# Patient Record
Sex: FEMALE | Race: BLACK OR AFRICAN AMERICAN | NOT HISPANIC OR LATINO | Employment: FULL TIME | URBAN - METROPOLITAN AREA
[De-identification: names, ages, dates, MRNs, and addresses within clinical notes are randomized per-mention and may not be internally consistent; named-entity substitution may affect disease eponyms.]

---

## 2017-11-05 ENCOUNTER — APPOINTMENT (OUTPATIENT)
Dept: RADIOLOGY | Facility: HOSPITAL | Age: 22
End: 2017-11-05
Payer: COMMERCIAL

## 2017-11-05 ENCOUNTER — OFFICE VISIT (OUTPATIENT)
Dept: URGENT CARE | Facility: CLINIC | Age: 22
End: 2017-11-05
Payer: COMMERCIAL

## 2017-11-05 VITALS
HEIGHT: 63 IN | TEMPERATURE: 97 F | HEART RATE: 96 BPM | SYSTOLIC BLOOD PRESSURE: 124 MMHG | DIASTOLIC BLOOD PRESSURE: 74 MMHG | WEIGHT: 134.94 LBS | BODY MASS INDEX: 23.91 KG/M2 | OXYGEN SATURATION: 98 %

## 2017-11-05 DIAGNOSIS — H92.02 LEFT EAR PAIN: ICD-10-CM
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
## 2017-11-05 DIAGNOSIS — M54.2 CERVICAL PAIN: ICD-10-CM

## 2017-11-05 DIAGNOSIS — M62.838 TRAPEZIUS MUSCLE SPASM: ICD-10-CM

## 2017-11-05 DIAGNOSIS — M79.632 PAIN OF LEFT FOREARM: ICD-10-CM

## 2017-11-05 DIAGNOSIS — H93.12 TINNITUS OF LEFT EAR: ICD-10-CM
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
## 2017-11-05 PROCEDURE — 99204 OFFICE O/P NEW MOD 45 MIN: CPT | Mod: S$GLB,,, | Performed by: FAMILY MEDICINE

## 2017-11-05 PROCEDURE — 73090 X-RAY EXAM OF FOREARM: CPT | Mod: TC,PO,LT

## 2017-11-05 PROCEDURE — 73090 X-RAY EXAM OF FOREARM: CPT | Mod: 26,LT,, | Performed by: RADIOLOGY

## 2017-11-05 PROCEDURE — 99999 PR PBB SHADOW E&M-NEW PATIENT-LVL III: CPT | Mod: PBBFAC,,,

## 2017-11-05 RX ORDER — NAPROXEN 500 MG/1
500 TABLET ORAL 2 TIMES DAILY
Qty: 20 TABLET | Refills: 0 | Status: SHIPPED | OUTPATIENT
Start: 2017-11-05 | End: 2017-11-15

## 2017-11-05 RX ORDER — CYCLOBENZAPRINE HCL 10 MG
10 TABLET ORAL NIGHTLY PRN
Qty: 20 TABLET | Refills: 0 | Status: SHIPPED | OUTPATIENT
Start: 2017-11-05 | End: 2017-11-15
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
## 2022-07-08 ENCOUNTER — OFFICE VISIT (OUTPATIENT)
Dept: INTERNAL MEDICINE | Facility: CLINIC | Age: 27
End: 2022-07-08
Payer: MEDICAID

## 2022-07-08 VITALS
DIASTOLIC BLOOD PRESSURE: 78 MMHG | HEIGHT: 63 IN | BODY MASS INDEX: 29.21 KG/M2 | SYSTOLIC BLOOD PRESSURE: 118 MMHG | TEMPERATURE: 97 F | HEART RATE: 80 BPM | WEIGHT: 164.88 LBS

## 2022-07-08 DIAGNOSIS — Z82.49 FAMILY HISTORY OF EARLY CAD: ICD-10-CM

## 2022-07-08 DIAGNOSIS — K21.9 GASTROESOPHAGEAL REFLUX DISEASE, UNSPECIFIED WHETHER ESOPHAGITIS PRESENT: Primary | ICD-10-CM

## 2022-07-08 DIAGNOSIS — Z12.4 SCREENING FOR MALIGNANT NEOPLASM OF CERVIX: ICD-10-CM

## 2022-07-08 DIAGNOSIS — H53.9 VISION DISTURBANCE: ICD-10-CM

## 2022-07-08 DIAGNOSIS — Z91.09 ENVIRONMENTAL ALLERGIES: ICD-10-CM

## 2022-07-08 DIAGNOSIS — Z28.9 DELAYED IMMUNIZATIONS: ICD-10-CM

## 2022-07-08 PROCEDURE — 99999 PR PBB SHADOW E&M-NEW PATIENT-LVL IV: ICD-10-PCS | Mod: PBBFAC,,, | Performed by: FAMILY MEDICINE

## 2022-07-08 PROCEDURE — 99204 OFFICE O/P NEW MOD 45 MIN: CPT | Mod: S$PBB,,, | Performed by: FAMILY MEDICINE

## 2022-07-08 PROCEDURE — 1159F PR MEDICATION LIST DOCUMENTED IN MEDICAL RECORD: ICD-10-PCS | Mod: CPTII,,, | Performed by: FAMILY MEDICINE

## 2022-07-08 PROCEDURE — 99204 OFFICE O/P NEW MOD 45 MIN: CPT | Mod: PBBFAC,PO | Performed by: FAMILY MEDICINE

## 2022-07-08 PROCEDURE — 3074F SYST BP LT 130 MM HG: CPT | Mod: CPTII,,, | Performed by: FAMILY MEDICINE

## 2022-07-08 PROCEDURE — 3074F PR MOST RECENT SYSTOLIC BLOOD PRESSURE < 130 MM HG: ICD-10-PCS | Mod: CPTII,,, | Performed by: FAMILY MEDICINE

## 2022-07-08 PROCEDURE — 3078F PR MOST RECENT DIASTOLIC BLOOD PRESSURE < 80 MM HG: ICD-10-PCS | Mod: CPTII,,, | Performed by: FAMILY MEDICINE

## 2022-07-08 PROCEDURE — 99999 PR PBB SHADOW E&M-NEW PATIENT-LVL IV: CPT | Mod: PBBFAC,,, | Performed by: FAMILY MEDICINE

## 2022-07-08 PROCEDURE — 1160F PR REVIEW ALL MEDS BY PRESCRIBER/CLIN PHARMACIST DOCUMENTED: ICD-10-PCS | Mod: CPTII,,, | Performed by: FAMILY MEDICINE

## 2022-07-08 PROCEDURE — 3078F DIAST BP <80 MM HG: CPT | Mod: CPTII,,, | Performed by: FAMILY MEDICINE

## 2022-07-08 PROCEDURE — 3008F BODY MASS INDEX DOCD: CPT | Mod: CPTII,,, | Performed by: FAMILY MEDICINE

## 2022-07-08 PROCEDURE — 99204 PR OFFICE/OUTPT VISIT, NEW, LEVL IV, 45-59 MIN: ICD-10-PCS | Mod: S$PBB,,, | Performed by: FAMILY MEDICINE

## 2022-07-08 PROCEDURE — 1159F MED LIST DOCD IN RCRD: CPT | Mod: CPTII,,, | Performed by: FAMILY MEDICINE

## 2022-07-08 PROCEDURE — 1160F RVW MEDS BY RX/DR IN RCRD: CPT | Mod: CPTII,,, | Performed by: FAMILY MEDICINE

## 2022-07-08 PROCEDURE — 3008F PR BODY MASS INDEX (BMI) DOCUMENTED: ICD-10-PCS | Mod: CPTII,,, | Performed by: FAMILY MEDICINE

## 2022-07-08 RX ORDER — PANTOPRAZOLE SODIUM 40 MG/1
40 TABLET, DELAYED RELEASE ORAL DAILY
Qty: 30 TABLET | Refills: 0 | Status: SHIPPED | OUTPATIENT
Start: 2022-07-08 | End: 2022-08-07

## 2022-07-08 NOTE — PROGRESS NOTES
Subjective:       Patient ID: Laverne Arcos is a 27 y.o. female.    Chief Complaint: Establish Care and Heartburn    Gastroesophageal Reflux  She complains of chest pain. She reports no abdominal pain or no belching. This is a recurrent problem. The current episode started 1 to 4 weeks ago. The problem occurs constantly. The problem has been gradually worsening.     Review of Systems   Respiratory: Negative for shortness of breath.    Cardiovascular: Positive for chest pain.   Gastrointestinal: Negative for abdominal pain.       Objective:      Physical Exam  Vitals and nursing note reviewed.   Constitutional:       General: She is not in acute distress.     Appearance: Normal appearance. She is well-developed. She is not diaphoretic.   HENT:      Head: Normocephalic and atraumatic.   Pulmonary:      Effort: Pulmonary effort is normal. No respiratory distress.      Breath sounds: Normal breath sounds. No wheezing.   Abdominal:      General: There is no distension.      Palpations: Abdomen is soft.      Tenderness: There is no abdominal tenderness. There is no guarding.   Skin:     General: Skin is warm and dry.      Findings: No erythema or rash.   Neurological:      Mental Status: She is alert.         Assessment:       1. Gastroesophageal reflux disease, unspecified whether esophagitis present    2. Screening for malignant neoplasm of cervix    3. Delayed immunizations    4. Vision disturbance    5. Family history of early CAD    6. Environmental allergies        Plan:     Problem List Items Addressed This Visit        Ophtho    Vision disturbance    Relevant Orders    Ambulatory referral/consult to Optometry       Cardiac/Vascular    Family history of early CAD    Relevant Orders    Ambulatory referral/consult to Cardiology       Renal/    Screening for malignant neoplasm of cervix       ID    Delayed immunizations       GI    Gastroesophageal reflux disease - Primary    Relevant Medications     pantoprazole (PROTONIX) 40 MG tablet    Other Relevant Orders    H. pylori antigen, stool       Other    Environmental allergies    Relevant Orders    Ambulatory referral/consult to Allergy

## 2022-07-13 DIAGNOSIS — Z82.49 FAMILY HISTORY OF EARLY CAD: Primary | ICD-10-CM

## 2022-07-14 ENCOUNTER — TELEPHONE (OUTPATIENT)
Dept: ALLERGY | Facility: CLINIC | Age: 27
End: 2022-07-14
Payer: MEDICAID

## 2022-07-14 NOTE — TELEPHONE ENCOUNTER
Nicolle , patient scheduled with [facility] 7/20/22 for allergy testing.///bentley          ----- Message from Maria M Navas sent at 7/14/2022  9:51 AM CDT -----  Regarding: Missed call  Contact: Patient  .Type:  Patient Returning Call    Who Called: Patient  Who Left Message for Patient: Nicolle   Does the patient know what this is regarding?: missed call / scheduling  Would the patient rather a call back or a response via [facility]? Call  Best Call Back Number: .[phone] (home)     Additional Information:

## 2022-07-15 ENCOUNTER — HOSPITAL ENCOUNTER (OUTPATIENT)
Dept: CARDIOLOGY | Facility: HOSPITAL | Age: 27
Discharge: HOME | End: 2022-07-15
Attending: INTERNAL MEDICINE
Payer: MEDICAID

## 2022-07-15 ENCOUNTER — OFFICE VISIT (OUTPATIENT)
Dept: CARDIOLOGY | Facility: CLINIC | Age: 27
End: 2022-07-15
Payer: MEDICAID

## 2022-07-15 VITALS
DIASTOLIC BLOOD PRESSURE: 82 MMHG | HEART RATE: 109 BPM | WEIGHT: 163 LBS | HEIGHT: 63 IN | OXYGEN SATURATION: 98 % | BODY MASS INDEX: 28.88 KG/M2 | SYSTOLIC BLOOD PRESSURE: 132 MMHG

## 2022-07-15 DIAGNOSIS — R07.89 OTHER CHEST PAIN: Primary | ICD-10-CM

## 2022-07-15 DIAGNOSIS — Z82.49 FAMILY HISTORY OF EARLY CAD: ICD-10-CM

## 2022-07-15 DIAGNOSIS — K21.9 GASTROESOPHAGEAL REFLUX DISEASE, UNSPECIFIED WHETHER ESOPHAGITIS PRESENT: ICD-10-CM

## 2022-07-15 PROCEDURE — 99204 PR OFFICE/OUTPT VISIT, NEW, LEVL IV, 45-59 MIN: ICD-10-PCS | Mod: S$PBB,,, | Performed by: INTERNAL MEDICINE

## 2022-07-15 PROCEDURE — 1160F RVW MEDS BY RX/DR IN RCRD: CPT | Mod: CPTII,,, | Performed by: INTERNAL MEDICINE

## 2022-07-15 PROCEDURE — 3075F SYST BP GE 130 - 139MM HG: CPT | Mod: CPTII,,, | Performed by: INTERNAL MEDICINE

## 2022-07-15 PROCEDURE — 1159F MED LIST DOCD IN RCRD: CPT | Mod: CPTII,,, | Performed by: INTERNAL MEDICINE

## 2022-07-15 PROCEDURE — 3008F PR BODY MASS INDEX (BMI) DOCUMENTED: ICD-10-PCS | Mod: CPTII,,, | Performed by: INTERNAL MEDICINE

## 2022-07-15 PROCEDURE — 3075F PR MOST RECENT SYSTOLIC BLOOD PRESS GE 130-139MM HG: ICD-10-PCS | Mod: CPTII,,, | Performed by: INTERNAL MEDICINE

## 2022-07-15 PROCEDURE — 99999 PR PBB SHADOW E&M-EST. PATIENT-LVL III: ICD-10-PCS | Mod: PBBFAC,,, | Performed by: INTERNAL MEDICINE

## 2022-07-15 PROCEDURE — 3079F DIAST BP 80-89 MM HG: CPT | Mod: CPTII,,, | Performed by: INTERNAL MEDICINE

## 2022-07-15 PROCEDURE — 3079F PR MOST RECENT DIASTOLIC BLOOD PRESSURE 80-89 MM HG: ICD-10-PCS | Mod: CPTII,,, | Performed by: INTERNAL MEDICINE

## 2022-07-15 PROCEDURE — 93005 ELECTROCARDIOGRAM TRACING: CPT

## 2022-07-15 PROCEDURE — 1159F PR MEDICATION LIST DOCUMENTED IN MEDICAL RECORD: ICD-10-PCS | Mod: CPTII,,, | Performed by: INTERNAL MEDICINE

## 2022-07-15 PROCEDURE — 99213 OFFICE O/P EST LOW 20 MIN: CPT | Mod: PBBFAC | Performed by: INTERNAL MEDICINE

## 2022-07-15 PROCEDURE — 3008F BODY MASS INDEX DOCD: CPT | Mod: CPTII,,, | Performed by: INTERNAL MEDICINE

## 2022-07-15 PROCEDURE — 93010 ELECTROCARDIOGRAM REPORT: CPT | Mod: ,,, | Performed by: STUDENT IN AN ORGANIZED HEALTH CARE EDUCATION/TRAINING PROGRAM

## 2022-07-15 PROCEDURE — 1160F PR REVIEW ALL MEDS BY PRESCRIBER/CLIN PHARMACIST DOCUMENTED: ICD-10-PCS | Mod: CPTII,,, | Performed by: INTERNAL MEDICINE

## 2022-07-15 PROCEDURE — 99204 OFFICE O/P NEW MOD 45 MIN: CPT | Mod: S$PBB,,, | Performed by: INTERNAL MEDICINE

## 2022-07-15 PROCEDURE — 99999 PR PBB SHADOW E&M-EST. PATIENT-LVL III: CPT | Mod: PBBFAC,,, | Performed by: INTERNAL MEDICINE

## 2022-07-15 PROCEDURE — 93010 EKG 12-LEAD: ICD-10-PCS | Mod: ,,, | Performed by: STUDENT IN AN ORGANIZED HEALTH CARE EDUCATION/TRAINING PROGRAM

## 2022-07-15 NOTE — PROGRESS NOTES
Subjective:   Patient ID:  Laverne Arcos is a 27 y.o. female who presents for cardiac consult of No chief complaint on file.    Referring Physician: Walker Bird MD   [id number] [place] [address]    Reason for consult: CVD risk eval      HPI  The patient came in today for cardiac consult of No chief complaint on file.    7/15/22  Laverne Arcos is a 27 y.o. female pt with FH CVD, GERD presents for CV eval of CP/ FH CVD.     BP today is elevated initially 130/100 --> 130/80s. , BMI 28 - 163 lbs. She has been having CP/reflux sx - started PPI, improved a week ago.     Patient feels no leg swelling, no PND, no palpitation, no dizziness, no syncope, no CNS symptoms.    Patient has fairly good exercise tolerance.    Patient is compliant with medications.    ECG - sinus tach, V rate 105, nonspec changes     FH - father -  of MI 55; mother - HTN, grandmother -  after stroke, had MI; grandfather - HTN    History reviewed. No pertinent past medical history.    History reviewed. No pertinent surgical history.    Social History     Tobacco Use    Smoking status: Never Smoker    Smokeless tobacco: Never Used   Substance Use Topics    Alcohol use: No    Drug use: No       Family History   Problem Relation Age of Onset    Hypertension Mother     Arthritis Maternal Grandmother     Diabetes Maternal Grandmother     Heart disease Maternal Grandmother     Stroke Maternal Grandmother     Heart attack Father     Cancer Maternal Grandfather        Patient's Medications   New Prescriptions    No medications on file   Previous Medications    PANTOPRAZOLE (PROTONIX) 40 MG TABLET    Take 1 tablet (40 mg total) by mouth once daily.    PRENATAL /IRON/FOLIC ACID (PRENATAL MULTI ORAL)    Take by mouth.   Modified Medications    No medications on file   Discontinued Medications    No medications on file       Review of Systems   Constitutional: Negative.    HENT: Negative.   "  Eyes: Negative.    Respiratory: Negative.    Cardiovascular: Positive for chest pain.   Gastrointestinal: Negative.    Genitourinary: Negative.    Musculoskeletal: Negative.    Skin: Negative.    Neurological: Negative.    Endo/Heme/Allergies: Negative.    Psychiatric/Behavioral: Negative.    All 12 systems otherwise negative.      Wt Readings from Last 3 Encounters:   07/15/22 73.9 kg (163 lb)   07/08/22 74.8 kg (164 lb 14.5 oz)   11/05/17 61.2 kg (134 lb 14.7 oz)     Temp Readings from Last 3 Encounters:   07/08/22 97.2 °F (36.2 °C)   11/05/17 97.4 °F (36.3 °C) (Tympanic)     BP Readings from Last 3 Encounters:   07/15/22 132/82   07/08/22 118/78   11/05/17 124/74     Pulse Readings from Last 3 Encounters:   07/15/22 109   07/08/22 80   11/05/17 96       /82 (BP Location: Right arm, Patient Position: Sitting, BP Method: Medium (Manual))   Pulse 109   Ht 5' 3" (1.6 m)   Wt 73.9 kg (163 lb)   SpO2 98%   BMI 28.87 kg/m²     Objective:   Physical Exam  Vitals and nursing note reviewed.   Constitutional:       General: She is not in acute distress.     Appearance: She is well-developed. She is not diaphoretic.   HENT:      Head: Normocephalic and atraumatic.      Nose: Nose normal.   Eyes:      General: No scleral icterus.     Conjunctiva/sclera: Conjunctivae normal.   Neck:      Thyroid: No thyromegaly.      Vascular: No JVD.   Cardiovascular:      Rate and Rhythm: Normal rate and regular rhythm.      Heart sounds: S1 normal and S2 normal. No murmur heard.    No friction rub. No gallop. No S3 or S4 sounds.   Pulmonary:      Effort: Pulmonary effort is normal. No respiratory distress.      Breath sounds: Normal breath sounds. No stridor. No wheezing or rales.   Chest:      Chest wall: No tenderness.   Abdominal:      General: Bowel sounds are normal. There is no distension.      Palpations: Abdomen is soft. There is no mass.      Tenderness: There is no abdominal tenderness. There is no rebound. "   Genitourinary:     Comments: Deferred  Musculoskeletal:         General: No tenderness or deformity. Normal range of motion.      Cervical back: Normal range of motion and neck supple.   Lymphadenopathy:      Cervical: No cervical adenopathy.   Skin:     General: Skin is warm and dry.      Coloration: Skin is not pale.      Findings: No erythema or rash.   Neurological:      Mental Status: She is alert and oriented to person, place, and time.      Motor: No abnormal muscle tone.      Coordination: Coordination normal.   Psychiatric:         Behavior: Behavior normal.         Thought Content: Thought content normal.         Judgment: Judgment normal.         No results found for: NA, K, CL, CO2, BUN, CREATININE, GLU, HGBA1C, MG, AST, ALT, ALBUMIN, PROT, BILITOT, WBC, HGB, HCT, MCV, PLT, INR, TSH, CHOL, HDL, LDLCALC, TRIG, BNP  Assessment:      1. Other chest pain    2. Family history of early CAD    3. Gastroesophageal reflux disease, unspecified whether esophagitis present        Plan:     1. FH CAD with CP  - order ECG stress test and 2D ECHO  - discussed non cardiac etiologies as well    2. GERD  - cont PPI  - f/u GI    Thank you for allowing me to participate in this patient's care. Please do not hesitate to contact me with any questions or concerns. Consult note has been forwarded to the referral physician.

## 2022-07-19 NOTE — PROGRESS NOTES
Subjective:   Patient: Laverne Arcos [id number], :1995   Visit date:2022 7:43 AM    Chief Complaint:  Allergies (Mainly during pregnancy, mostly resolved since delivering 3 months ago./Symptoms included nasal congestion and pnd.)    HPI:    Prior notes reviewed by myself.  Clinical documentation obtained by nursing staff reviewed.       HPI:     21 - 26 yo F referred by pcp for an evaluation of allergies:    On and off allergy symptoms most of her life which were always manageable until pt was pregnant - currently 3 mo postpartum.   Increased/worsened allergy ssx while pregnant - nasal congestion, itchy, throat, sneezing. Took Zyrtec prn and this did help.   Since giving birth, symptoms have improved. Are 'mild' today.  Historically, fall is a worse season for her.   Never had allergy testing.   No asthma  No eczema       Hx of GERD, on protonix.         Environmental History:  Environmental Hx: Pets in the home: none. Does not smoke.       History reviewed. No pertinent past medical history.     Family History   Problem Relation Age of Onset    Hypertension Mother     Arthritis Maternal Grandmother     Diabetes Maternal Grandmother     Heart disease Maternal Grandmother     Stroke Maternal Grandmother     Heart attack Father     Cancer Maternal Grandfather        Social History     Socioeconomic History    Marital status: Single   Tobacco Use    Smoking status: Never Smoker    Smokeless tobacco: Never Used   Substance and Sexual Activity    Alcohol use: No    Drug use: No    Sexual activity: Yes     Partners: Male       Review of patient's allergies indicates:  No Known Allergies        Medication List with Changes/Refills   New Medications    FLUTICASONE PROPIONATE (FLONASE) 50 MCG/ACTUATION NASAL SPRAY    2 sprays (100 mcg total) by Each Nostril route 2 (two) times daily.    LEVOCETIRIZINE (XYZAL) 5 MG TABLET    Take 1 tablet (5 mg total) by mouth every evening.   Current  Medications    PANTOPRAZOLE (PROTONIX) 40 MG TABLET    Take 1 tablet (40 mg total) by mouth once daily.    PRENATAL /IRON/FOLIC ACID (PRENATAL MULTI ORAL)    Take by mouth.         Objective:     Physical Exam:  Vitals:  /86 (BP Location: Right arm, Patient Position: Sitting)   Pulse 107   Temp 98.7 °F (37.1 °C)   Wt 74.5 kg (164 lb 3.9 oz)   BMI 29.09 kg/m²   Constitutional:       General: No acute distress. Alert, well developed.   HENT:      Head: Normocephalic, no lesions.      Right Ear: Pinna and external ear appears normal. There is no impacted cerumen. TM clear, intact, wnl. EAC WNL.      Left Ear: Pinna and external ear appears normal. There is no impacted cerumen. TM clear, intact, wnl. EAC WNL.     Nose: No mass or lesion. Clear mucus. BIT's WNL.     Mouth/Throat: No oropharyngeal exudate or posterior oropharyngeal erythema.   Eyes:      General: No scleral icterus.Sclera white, extraocular movements intact.        Right eye: No discharge.         Left eye: No discharge.   Neck: Supple, no palpable nodes, no masses, trachea midline, no thyromegaly.   Cardiovascular:      Rate and Rhythm: Normal rate and regular rhythm.      Heart sounds: Normal heart sounds. No murmur heard.   Pulmonary:      Effort: Pulmonary effort is normal. No respiratory distress.      Breath sounds: Normal breath sounds. No stridor. No rhonchi, or rales. No crackles or wheezing.   Musculoskeletal:         General: No swelling, tenderness, deformity or signs of injury.    Skin:     General: Skin is warm.      Coloration: Skin is not jaundiced or pale.      Findings: No bruising, erythema, or rash.   Neurological:      Alert. Moves all extremities spontaneously  Psychiatric:         Mood is normal. Behavior is normal.              Assessment & Plan:   Seasonal allergic rhinitis, unspecified trigger  -     Ambulatory referral/consult to Allergy    Other orders  -     levocetirizine (XYZAL) 5 MG tablet; Take 1 tablet (5  mg total) by mouth every evening.  Dispense: 30 tablet; Refill: 11  -     fluticasone propionate (FLONASE) 50 mcg/actuation nasal spray; 2 sprays (100 mcg total) by Each Nostril route 2 (two) times daily.  Dispense: 9.9 mL; Refill: 11    Advised a trial with Xyzal and Flonase x 4-6 weeks.   Offered pt testing today but symptoms are improving since giving birth and relieved with anti-histamine, wishes to consider in future.   Plan to recheck in 3 mo, sooner if pt needs.     Thank you for allowing me to participate in the care of Laverne.        Marcy Sullivan PA-C  [facility]  [facility]  [address]  [place] [id number]  P: [phone]  F: [phone]

## 2022-07-20 ENCOUNTER — OFFICE VISIT (OUTPATIENT)
Dept: ALLERGY | Facility: CLINIC | Age: 27
End: 2022-07-20
Payer: MEDICAID

## 2022-07-20 VITALS
SYSTOLIC BLOOD PRESSURE: 135 MMHG | DIASTOLIC BLOOD PRESSURE: 86 MMHG | TEMPERATURE: 99 F | BODY MASS INDEX: 29.09 KG/M2 | HEART RATE: 107 BPM | WEIGHT: 164.25 LBS

## 2022-07-20 DIAGNOSIS — J30.2 SEASONAL ALLERGIC RHINITIS, UNSPECIFIED TRIGGER: ICD-10-CM

## 2022-07-20 PROCEDURE — 1160F PR REVIEW ALL MEDS BY PRESCRIBER/CLIN PHARMACIST DOCUMENTED: ICD-10-PCS | Mod: CPTII,,, | Performed by: PHYSICIAN ASSISTANT

## 2022-07-20 PROCEDURE — 3008F BODY MASS INDEX DOCD: CPT | Mod: CPTII,,, | Performed by: PHYSICIAN ASSISTANT

## 2022-07-20 PROCEDURE — 99999 PR PBB SHADOW E&M-EST. PATIENT-LVL III: ICD-10-PCS | Mod: PBBFAC,,, | Performed by: PHYSICIAN ASSISTANT

## 2022-07-20 PROCEDURE — 1160F RVW MEDS BY RX/DR IN RCRD: CPT | Mod: CPTII,,, | Performed by: PHYSICIAN ASSISTANT

## 2022-07-20 PROCEDURE — 3008F PR BODY MASS INDEX (BMI) DOCUMENTED: ICD-10-PCS | Mod: CPTII,,, | Performed by: PHYSICIAN ASSISTANT

## 2022-07-20 PROCEDURE — 1159F PR MEDICATION LIST DOCUMENTED IN MEDICAL RECORD: ICD-10-PCS | Mod: CPTII,,, | Performed by: PHYSICIAN ASSISTANT

## 2022-07-20 PROCEDURE — 3075F SYST BP GE 130 - 139MM HG: CPT | Mod: CPTII,,, | Performed by: PHYSICIAN ASSISTANT

## 2022-07-20 PROCEDURE — 1159F MED LIST DOCD IN RCRD: CPT | Mod: CPTII,,, | Performed by: PHYSICIAN ASSISTANT

## 2022-07-20 PROCEDURE — 99203 OFFICE O/P NEW LOW 30 MIN: CPT | Mod: S$PBB,,, | Performed by: PHYSICIAN ASSISTANT

## 2022-07-20 PROCEDURE — 99213 OFFICE O/P EST LOW 20 MIN: CPT | Mod: PBBFAC | Performed by: PHYSICIAN ASSISTANT

## 2022-07-20 PROCEDURE — 99203 PR OFFICE/OUTPT VISIT, NEW, LEVL III, 30-44 MIN: ICD-10-PCS | Mod: S$PBB,,, | Performed by: PHYSICIAN ASSISTANT

## 2022-07-20 PROCEDURE — 3079F PR MOST RECENT DIASTOLIC BLOOD PRESSURE 80-89 MM HG: ICD-10-PCS | Mod: CPTII,,, | Performed by: PHYSICIAN ASSISTANT

## 2022-07-20 PROCEDURE — 3075F PR MOST RECENT SYSTOLIC BLOOD PRESS GE 130-139MM HG: ICD-10-PCS | Mod: CPTII,,, | Performed by: PHYSICIAN ASSISTANT

## 2022-07-20 PROCEDURE — 3079F DIAST BP 80-89 MM HG: CPT | Mod: CPTII,,, | Performed by: PHYSICIAN ASSISTANT

## 2022-07-20 PROCEDURE — 99999 PR PBB SHADOW E&M-EST. PATIENT-LVL III: CPT | Mod: PBBFAC,,, | Performed by: PHYSICIAN ASSISTANT

## 2022-07-20 RX ORDER — LEVOCETIRIZINE DIHYDROCHLORIDE 5 MG/1
5 TABLET, FILM COATED ORAL NIGHTLY
Qty: 30 TABLET | Refills: 11 | Status: SHIPPED | OUTPATIENT
Start: 2022-07-20 | End: 2023-07-20

## 2022-07-20 RX ORDER — FLUTICASONE PROPIONATE 50 MCG
2 SPRAY, SUSPENSION (ML) NASAL 2 TIMES DAILY
Qty: 9.9 ML | Refills: 11 | Status: SHIPPED | OUTPATIENT
Start: 2022-07-20

## 2022-07-21 PROBLEM — Z00.00 WELLNESS EXAMINATION: Status: ACTIVE | Noted: 2022-07-21

## 2022-07-21 NOTE — PROGRESS NOTES
Subjective:       Patient ID: Laverne Arcos is a 27 y.o. female.    Chief Complaint: Annual Exam    Laverne Arcos is a 27 y.o. female and is here for a comprehensive physical exam.    Do you take any herbs or supplements that were not prescribed by a doctor? no  Are you taking calcium supplements? no  Are you taking aspirin daily? no     History:  Any STD's in the past? none    The following portions of the patient's history were reviewed and updated as appropriate: allergies, current medications, past family history, past medical history, past social history, past surgical history and problem list.    Review of Systems  Do you have pain that bothers you in your daily life? no  Pertinent items are noted in HPI.      2. Patient Counseling:  --Nutrition: Stressed importance of moderation in sodium/caffeine intake, saturated fat and cholesterol, caloric balance.  --Exercise: Stressed the importance of regular exercise.   --Substance Abuse: Discussed cessation/primary prevention of tobacco, alcohol - nonuser.   --Sexuality: Discussed sexually transmitted disease.  --Injury prevention: Discussed safety belts, smoke detector.   --Dental health: Discussed dental health.  --Immunizations reviewed.    3. Discussed the patient's BMI.  4. Follow up as needed for acute illness      Review of Systems   Constitutional: Negative for fever.   HENT: Negative for congestion.    Eyes: Negative for discharge.   Respiratory: Negative for shortness of breath.    Cardiovascular: Negative for chest pain.   Gastrointestinal: Negative for abdominal pain.   Genitourinary: Negative for difficulty urinating.   Musculoskeletal: Negative for joint swelling.   Neurological: Negative for dizziness.   Psychiatric/Behavioral: Negative for agitation.       Objective:      Physical Exam  Vitals and nursing note reviewed.   Constitutional:       General: She is not in acute distress.     Appearance: Normal appearance. She is  well-developed. She is not diaphoretic.   HENT:      Head: Normocephalic and atraumatic.   Eyes:      General: No scleral icterus.     Conjunctiva/sclera: Conjunctivae normal.   Cardiovascular:      Rate and Rhythm: Normal rate and regular rhythm.   Pulmonary:      Effort: Pulmonary effort is normal. No respiratory distress.      Breath sounds: Normal breath sounds. No wheezing.   Abdominal:      General: There is no distension.      Palpations: Abdomen is soft.      Tenderness: There is no abdominal tenderness. There is no guarding.   Skin:     General: Skin is warm.      Coloration: Skin is not pale.      Findings: No erythema or rash.      Comments: Good turgor   Neurological:      Mental Status: She is alert.   Psychiatric:         Mood and Affect: Mood normal.         Thought Content: Thought content normal.         Assessment:       1. Wellness examination    2. Screening for malignant neoplasm of cervix        Plan:     Problem List Items Addressed This Visit        Renal/    Screening for malignant neoplasm of cervix       Other    Wellness examination - Primary    Relevant Orders    CBC Auto Differential    Comprehensive Metabolic Panel    Hemoglobin A1C    Ferritin    Iron and TIBC    Hepatitis C Antibody    HIV 1/2 Ag/Ab (4th Gen)    Lipid Panel    TSH

## 2022-07-22 ENCOUNTER — OFFICE VISIT (OUTPATIENT)
Dept: INTERNAL MEDICINE | Facility: CLINIC | Age: 27
End: 2022-07-22
Payer: MEDICAID

## 2022-07-22 ENCOUNTER — LAB VISIT (OUTPATIENT)
Dept: LAB | Facility: HOSPITAL | Age: 27
End: 2022-07-22
Attending: FAMILY MEDICINE
Payer: MEDICAID

## 2022-07-22 VITALS
TEMPERATURE: 98 F | HEIGHT: 63 IN | DIASTOLIC BLOOD PRESSURE: 82 MMHG | WEIGHT: 164 LBS | HEART RATE: 82 BPM | BODY MASS INDEX: 29.06 KG/M2 | SYSTOLIC BLOOD PRESSURE: 122 MMHG

## 2022-07-22 DIAGNOSIS — Z12.4 SCREENING FOR MALIGNANT NEOPLASM OF CERVIX: ICD-10-CM

## 2022-07-22 DIAGNOSIS — Z00.00 WELLNESS EXAMINATION: ICD-10-CM

## 2022-07-22 DIAGNOSIS — Z00.00 WELLNESS EXAMINATION: Primary | ICD-10-CM

## 2022-07-22 LAB
ALBUMIN SERPL BCP-MCNC: 3.9 G/DL (ref 3.5–5.2)
ALP SERPL-CCNC: 97 U/L (ref 55–135)
ALT SERPL W/O P-5'-P-CCNC: 11 U/L (ref 10–44)
ANION GAP SERPL CALC-SCNC: 10 MMOL/L (ref 8–16)
AST SERPL-CCNC: 21 U/L (ref 10–40)
BASOPHILS # BLD AUTO: 0.02 K/UL (ref 0–0.2)
BASOPHILS NFR BLD: 0.3 % (ref 0–1.9)
BILIRUB SERPL-MCNC: 0.3 MG/DL (ref 0.1–1)
BUN SERPL-MCNC: 12 MG/DL (ref 6–20)
CALCIUM SERPL-MCNC: 9.7 MG/DL (ref 8.7–10.5)
CHLORIDE SERPL-SCNC: 106 MMOL/L (ref 95–110)
CHOLEST SERPL-MCNC: 160 MG/DL (ref 120–199)
CHOLEST/HDLC SERPL: 3.1 {RATIO} (ref 2–5)
CO2 SERPL-SCNC: 22 MMOL/L (ref 23–29)
CREAT SERPL-MCNC: 0.8 MG/DL (ref 0.5–1.4)
DIFFERENTIAL METHOD: ABNORMAL
EOSINOPHIL # BLD AUTO: 0.1 K/UL (ref 0–0.5)
EOSINOPHIL NFR BLD: 2 % (ref 0–8)
ERYTHROCYTE [DISTWIDTH] IN BLOOD BY AUTOMATED COUNT: 15.6 % (ref 11.5–14.5)
EST. GFR  (AFRICAN AMERICAN): >60 ML/MIN/1.73 M^2
EST. GFR  (NON AFRICAN AMERICAN): >60 ML/MIN/1.73 M^2
ESTIMATED AVG GLUCOSE: 103 MG/DL (ref 68–131)
FERRITIN SERPL-MCNC: 11 NG/ML (ref 20–300)
GLUCOSE SERPL-MCNC: 89 MG/DL (ref 70–110)
HBA1C MFR BLD: 5.2 % (ref 4–5.6)
HCT VFR BLD AUTO: 38.9 % (ref 37–48.5)
HDLC SERPL-MCNC: 52 MG/DL (ref 40–75)
HDLC SERPL: 32.5 % (ref 20–50)
HGB BLD-MCNC: 11.4 G/DL (ref 12–16)
IMM GRANULOCYTES # BLD AUTO: 0.01 K/UL (ref 0–0.04)
IMM GRANULOCYTES NFR BLD AUTO: 0.1 % (ref 0–0.5)
IRON SERPL-MCNC: 22 UG/DL (ref 30–160)
LDLC SERPL CALC-MCNC: 101 MG/DL (ref 63–159)
LYMPHOCYTES # BLD AUTO: 3.7 K/UL (ref 1–4.8)
LYMPHOCYTES NFR BLD: 53.3 % (ref 18–48)
MCH RBC QN AUTO: 26.1 PG (ref 27–31)
MCHC RBC AUTO-ENTMCNC: 29.3 G/DL (ref 32–36)
MCV RBC AUTO: 89 FL (ref 82–98)
MONOCYTES # BLD AUTO: 0.6 K/UL (ref 0.3–1)
MONOCYTES NFR BLD: 8 % (ref 4–15)
NEUTROPHILS # BLD AUTO: 2.5 K/UL (ref 1.8–7.7)
NEUTROPHILS NFR BLD: 36.3 % (ref 38–73)
NONHDLC SERPL-MCNC: 108 MG/DL
NRBC BLD-RTO: 0 /100 WBC
PLATELET # BLD AUTO: 314 K/UL (ref 150–450)
PMV BLD AUTO: 11.2 FL (ref 9.2–12.9)
POTASSIUM SERPL-SCNC: 3.9 MMOL/L (ref 3.5–5.1)
PROT SERPL-MCNC: 7.8 G/DL (ref 6–8.4)
RBC # BLD AUTO: 4.37 M/UL (ref 4–5.4)
SATURATED IRON: 5 % (ref 20–50)
SODIUM SERPL-SCNC: 138 MMOL/L (ref 136–145)
TOTAL IRON BINDING CAPACITY: 459 UG/DL (ref 250–450)
TRANSFERRIN SERPL-MCNC: 310 MG/DL (ref 200–375)
TRIGL SERPL-MCNC: 35 MG/DL (ref 30–150)
TSH SERPL DL<=0.005 MIU/L-ACNC: 1.1 UIU/ML (ref 0.4–4)
WBC # BLD AUTO: 6.91 K/UL (ref 3.9–12.7)

## 2022-07-22 PROCEDURE — 3074F SYST BP LT 130 MM HG: CPT | Mod: CPTII,,, | Performed by: FAMILY MEDICINE

## 2022-07-22 PROCEDURE — 80061 LIPID PANEL: CPT | Performed by: FAMILY MEDICINE

## 2022-07-22 PROCEDURE — 3008F PR BODY MASS INDEX (BMI) DOCUMENTED: ICD-10-PCS | Mod: CPTII,,, | Performed by: FAMILY MEDICINE

## 2022-07-22 PROCEDURE — 1159F MED LIST DOCD IN RCRD: CPT | Mod: CPTII,,, | Performed by: FAMILY MEDICINE

## 2022-07-22 PROCEDURE — 82728 ASSAY OF FERRITIN: CPT | Performed by: FAMILY MEDICINE

## 2022-07-22 PROCEDURE — 3079F PR MOST RECENT DIASTOLIC BLOOD PRESSURE 80-89 MM HG: ICD-10-PCS | Mod: CPTII,,, | Performed by: FAMILY MEDICINE

## 2022-07-22 PROCEDURE — 3074F PR MOST RECENT SYSTOLIC BLOOD PRESSURE < 130 MM HG: ICD-10-PCS | Mod: CPTII,,, | Performed by: FAMILY MEDICINE

## 2022-07-22 PROCEDURE — 84443 ASSAY THYROID STIM HORMONE: CPT | Performed by: FAMILY MEDICINE

## 2022-07-22 PROCEDURE — 80053 COMPREHEN METABOLIC PANEL: CPT | Performed by: FAMILY MEDICINE

## 2022-07-22 PROCEDURE — 99999 PR PBB SHADOW E&M-EST. PATIENT-LVL III: CPT | Mod: PBBFAC,,, | Performed by: FAMILY MEDICINE

## 2022-07-22 PROCEDURE — 36415 COLL VENOUS BLD VENIPUNCTURE: CPT | Mod: PO | Performed by: FAMILY MEDICINE

## 2022-07-22 PROCEDURE — 1160F PR REVIEW ALL MEDS BY PRESCRIBER/CLIN PHARMACIST DOCUMENTED: ICD-10-PCS | Mod: CPTII,,, | Performed by: FAMILY MEDICINE

## 2022-07-22 PROCEDURE — 3079F DIAST BP 80-89 MM HG: CPT | Mod: CPTII,,, | Performed by: FAMILY MEDICINE

## 2022-07-22 PROCEDURE — 3008F BODY MASS INDEX DOCD: CPT | Mod: CPTII,,, | Performed by: FAMILY MEDICINE

## 2022-07-22 PROCEDURE — 1159F PR MEDICATION LIST DOCUMENTED IN MEDICAL RECORD: ICD-10-PCS | Mod: CPTII,,, | Performed by: FAMILY MEDICINE

## 2022-07-22 PROCEDURE — 87389 HIV-1 AG W/HIV-1&-2 AB AG IA: CPT | Performed by: FAMILY MEDICINE

## 2022-07-22 PROCEDURE — 85025 COMPLETE CBC W/AUTO DIFF WBC: CPT | Performed by: FAMILY MEDICINE

## 2022-07-22 PROCEDURE — 1160F RVW MEDS BY RX/DR IN RCRD: CPT | Mod: CPTII,,, | Performed by: FAMILY MEDICINE

## 2022-07-22 PROCEDURE — 99213 OFFICE O/P EST LOW 20 MIN: CPT | Mod: PBBFAC,PO | Performed by: FAMILY MEDICINE

## 2022-07-22 PROCEDURE — 99395 PREV VISIT EST AGE 18-39: CPT | Mod: S$PBB,,, | Performed by: FAMILY MEDICINE

## 2022-07-22 PROCEDURE — 83036 HEMOGLOBIN GLYCOSYLATED A1C: CPT | Performed by: FAMILY MEDICINE

## 2022-07-22 PROCEDURE — 84466 ASSAY OF TRANSFERRIN: CPT | Performed by: FAMILY MEDICINE

## 2022-07-22 PROCEDURE — 99999 PR PBB SHADOW E&M-EST. PATIENT-LVL III: ICD-10-PCS | Mod: PBBFAC,,, | Performed by: FAMILY MEDICINE

## 2022-07-22 PROCEDURE — 86803 HEPATITIS C AB TEST: CPT | Performed by: FAMILY MEDICINE

## 2022-07-22 PROCEDURE — 99395 PR PREVENTIVE VISIT,EST,18-39: ICD-10-PCS | Mod: S$PBB,,, | Performed by: FAMILY MEDICINE

## 2022-07-25 LAB — HCV AB SERPL QL IA: NEGATIVE

## 2022-07-26 ENCOUNTER — TELEPHONE (OUTPATIENT)
Dept: OPHTHALMOLOGY | Facility: CLINIC | Age: 27
End: 2022-07-26
Payer: MEDICAID

## 2022-07-26 LAB — HIV 1+2 AB+HIV1 P24 AG SERPL QL IA: NEGATIVE

## 2022-07-26 NOTE — TELEPHONE ENCOUNTER
Called patient to reschedule appointment. Patient did not answer, left detailed voicemail with call back number.----- Message from Esteltia Glez sent at 7/26/2022 10:12 AM CDT -----  Contact: Laverne  Patient would like a call back at [phone] in regards to rescheduling her appointment.    Thanks

## 2022-08-01 ENCOUNTER — TELEPHONE (OUTPATIENT)
Dept: HEMATOLOGY/ONCOLOGY | Facility: CLINIC | Age: 27
End: 2022-08-01
Payer: MEDICAID

## 2022-08-01 ENCOUNTER — PATIENT MESSAGE (OUTPATIENT)
Dept: HEMATOLOGY/ONCOLOGY | Facility: CLINIC | Age: 27
End: 2022-08-01
Payer: MEDICAID

## 2022-08-01 DIAGNOSIS — D50.9 IRON DEFICIENCY ANEMIA, UNSPECIFIED IRON DEFICIENCY ANEMIA TYPE: Primary | ICD-10-CM

## 2022-08-05 ENCOUNTER — HOSPITAL ENCOUNTER (OUTPATIENT)
Dept: CARDIOLOGY | Facility: HOSPITAL | Age: 27
Discharge: HOME | End: 2022-08-05
Attending: INTERNAL MEDICINE
Payer: MEDICAID

## 2022-08-05 VITALS — WEIGHT: 164 LBS | BODY MASS INDEX: 29.06 KG/M2 | HEIGHT: 63 IN

## 2022-08-05 DIAGNOSIS — R07.89 OTHER CHEST PAIN: ICD-10-CM

## 2022-08-05 DIAGNOSIS — K21.9 GASTROESOPHAGEAL REFLUX DISEASE, UNSPECIFIED WHETHER ESOPHAGITIS PRESENT: ICD-10-CM

## 2022-08-05 DIAGNOSIS — Z82.49 FAMILY HISTORY OF EARLY CAD: ICD-10-CM

## 2022-08-05 LAB
AV INDEX (PROSTH): 0.81
AV MEAN GRADIENT: 2 MMHG
AV PEAK GRADIENT: 5 MMHG
AV VALVE AREA: 2.5 CM2
AV VELOCITY RATIO: 0.85
BSA FOR ECHO PROCEDURE: 1.82 M2
CV ECHO LV RWT: 0.36 CM
DOP CALC AO PEAK VEL: 1.1 M/S
DOP CALC AO VTI: 23.4 CM
DOP CALC LVOT AREA: 3.1 CM2
DOP CALC LVOT DIAMETER: 1.98 CM
DOP CALC LVOT PEAK VEL: 0.94 M/S
DOP CALC LVOT STROKE VOLUME: 58.47 CM3
DOP CALC RVOT PEAK VEL: 0.75 M/S
DOP CALC RVOT VTI: 17.4 CM
DOP CALCLVOT PEAK VEL VTI: 19 CM
E WAVE DECELERATION TIME: 259.04 MSEC
E/A RATIO: 1.34
E/E' RATIO: 5.87 M/S
ECHO LV POSTERIOR WALL: 0.84 CM (ref 0.6–1.1)
EJECTION FRACTION: 60 %
FRACTIONAL SHORTENING: 42 % (ref 28–44)
INTERVENTRICULAR SEPTUM: 0.83 CM (ref 0.6–1.1)
IVRT: 79.92 MSEC
LA MAJOR: 4.32 CM
LA MINOR: 3.42 CM
LA WIDTH: 3.4 CM
LEFT ATRIUM SIZE: 3.76 CM
LEFT ATRIUM VOLUME INDEX MOD: 20.1 ML/M2
LEFT ATRIUM VOLUME INDEX: 23.3 ML/M2
LEFT ATRIUM VOLUME MOD: 35.7 CM3
LEFT ATRIUM VOLUME: 41.48 CM3
LEFT INTERNAL DIMENSION IN SYSTOLE: 2.68 CM (ref 2.1–4)
LEFT VENTRICLE DIASTOLIC VOLUME INDEX: 55.15 ML/M2
LEFT VENTRICLE DIASTOLIC VOLUME: 98.17 ML
LEFT VENTRICLE MASS INDEX: 71 G/M2
LEFT VENTRICLE SYSTOLIC VOLUME INDEX: 14.9 ML/M2
LEFT VENTRICLE SYSTOLIC VOLUME: 26.46 ML
LEFT VENTRICULAR INTERNAL DIMENSION IN DIASTOLE: 4.62 CM (ref 3.5–6)
LEFT VENTRICULAR MASS: 125.61 G
LV LATERAL E/E' RATIO: 5.35 M/S
LV SEPTAL E/E' RATIO: 6.5 M/S
LVOT MG: 1.78 MMHG
LVOT MV: 0.62 CM/S
MV PEAK A VEL: 0.68 M/S
MV PEAK E VEL: 0.91 M/S
PISA TR MAX VEL: 2.42 M/S
PULM VEIN S/D RATIO: 0.85
PV MEAN GRADIENT: 1.21 MMHG
PV PEAK D VEL: 0.58 M/S
PV PEAK S VEL: 0.5 M/S
PV PEAK VELOCITY: 1.21 CM/S
RA MAJOR: 4.2 CM
RA PRESSURE: 3 MMHG
RA WIDTH: 2.44 CM
RIGHT VENTRICULAR END-DIASTOLIC DIMENSION: 3.33 CM
SINUS: 2.47 CM
STJ: 2.41 CM
TDI LATERAL: 0.17 M/S
TDI SEPTAL: 0.14 M/S
TDI: 0.16 M/S
TR MAX PG: 23 MMHG
TRICUSPID ANNULAR PLANE SYSTOLIC EXCURSION: 2.14 CM
TV REST PULMONARY ARTERY PRESSURE: 26 MMHG

## 2022-08-05 PROCEDURE — 93306 TTE W/DOPPLER COMPLETE: CPT | Mod: 26,,, | Performed by: STUDENT IN AN ORGANIZED HEALTH CARE EDUCATION/TRAINING PROGRAM

## 2022-08-05 PROCEDURE — 93306 TTE W/DOPPLER COMPLETE: CPT

## 2022-08-05 PROCEDURE — 93306 ECHO (CUPID ONLY): ICD-10-PCS | Mod: 26,,, | Performed by: STUDENT IN AN ORGANIZED HEALTH CARE EDUCATION/TRAINING PROGRAM

## 2022-08-09 ENCOUNTER — TELEPHONE (OUTPATIENT)
Dept: HEMATOLOGY/ONCOLOGY | Facility: CLINIC | Age: 27
End: 2022-08-09
Payer: MEDICAID

## 2022-08-10 ENCOUNTER — OFFICE VISIT (OUTPATIENT)
Dept: HEMATOLOGY/ONCOLOGY | Facility: CLINIC | Age: 27
End: 2022-08-10
Payer: MEDICAID

## 2022-08-10 DIAGNOSIS — O99.019 IRON DEFICIENCY ANEMIA OF MOTHER DURING PREGNANCY: ICD-10-CM

## 2022-08-10 DIAGNOSIS — D50.9 IRON DEFICIENCY ANEMIA OF MOTHER DURING PREGNANCY: ICD-10-CM

## 2022-08-10 PROCEDURE — 3044F PR MOST RECENT HEMOGLOBIN A1C LEVEL <7.0%: ICD-10-PCS | Mod: CPTII,95,, | Performed by: INTERNAL MEDICINE

## 2022-08-10 PROCEDURE — 99204 PR OFFICE/OUTPT VISIT, NEW, LEVL IV, 45-59 MIN: ICD-10-PCS | Mod: 95,,, | Performed by: INTERNAL MEDICINE

## 2022-08-10 PROCEDURE — 3044F HG A1C LEVEL LT 7.0%: CPT | Mod: CPTII,95,, | Performed by: INTERNAL MEDICINE

## 2022-08-10 PROCEDURE — 99204 OFFICE O/P NEW MOD 45 MIN: CPT | Mod: 95,,, | Performed by: INTERNAL MEDICINE

## 2022-08-10 NOTE — PROGRESS NOTES
This was a virtual visit due to patient location in [place], patient was at home.  She consented for this audiovisual visit.    Service Date:  8/10/22    Chief Complaint: Iron def anemia    Laverne Arcos is a 27 y.o. female referred to me for iron deficiency anemia.  She recently delivered a healthy baby boy a who is about 4-month-old.  She stated she was not aware of any iron deficiency during her pregnancy but did take a prenatal vitamin with iron in it.  She denies any history of heavy menstrual cycles.  She denies any fatigue or shortness of breath currently, and states she is mostly asymptomatic.    Review of Systems   Constitutional: Negative.    HENT: Negative.    Eyes: Negative.    Respiratory: Negative.    Cardiovascular: Negative.    Gastrointestinal: Negative.    Endocrine: Negative.    Genitourinary: Negative.    Musculoskeletal: Negative.    Integumentary:  Negative.   Neurological: Negative.    Hematological: Negative.    Psychiatric/Behavioral: Negative.         Current Outpatient Medications   Medication Instructions    fluticasone propionate (FLONASE) 100 mcg, Each Nostril, 2 times daily    levocetirizine (XYZAL) 5 mg, Oral, Nightly    pantoprazole (PROTONIX) 40 mg, Oral, Daily    prenatal no122/iron/folic acid (PRENATAL MULTI ORAL) Oral        No past medical history on file.     No past surgical history on file.     Family History   Problem Relation Age of Onset    Hypertension Mother     Arthritis Maternal Grandmother     Diabetes Maternal Grandmother     Heart disease Maternal Grandmother     Stroke Maternal Grandmother     Heart attack Father     Cancer Maternal Grandfather        Social History     Tobacco Use    Smoking status: Never Smoker    Smokeless tobacco: Never Used   Substance Use Topics    Alcohol use: No    Drug use: No         There were no vitals filed for this visit.     Physical Exam:  There were no vitals taken for this visit.    Physical  Exam  Constitutional:       Appearance: Normal appearance.   HENT:      Head: Normocephalic and atraumatic.      Nose: Nose normal.      Mouth/Throat:      Mouth: Mucous membranes are moist.      Pharynx: Oropharynx is clear.   Eyes:      Conjunctiva/sclera: Conjunctivae normal.   Cardiovascular:      Rate and Rhythm: Normal rate and regular rhythm.      Heart sounds: Normal heart sounds.   Pulmonary:      Effort: Pulmonary effort is normal.      Breath sounds: Normal breath sounds.   Abdominal:      General: Abdomen is flat. Bowel sounds are normal.      Palpations: Abdomen is soft.   Musculoskeletal:         General: Normal range of motion.      Cervical back: Normal range of motion and neck supple.   Skin:     General: Skin is warm and dry.   Neurological:      General: No focal deficit present.      Mental Status: She is alert and oriented to person, place, and time. Mental status is at baseline.   Psychiatric:         Mood and Affect: Mood normal.          Labs:  Lab Results   Component Value Date    WBC 6.91 07/22/2022    RBC 4.37 07/22/2022    HGB 11.4 (L) 07/22/2022    HCT 38.9 07/22/2022    MCV 89 07/22/2022    MCH 26.1 (L) 07/22/2022    MCHC 29.3 (L) 07/22/2022    RDW 15.6 (H) 07/22/2022     07/22/2022    MPV 11.2 07/22/2022    GRAN 2.5 07/22/2022    GRAN 36.3 (L) 07/22/2022    LYMPH 3.7 07/22/2022    LYMPH 53.3 (H) 07/22/2022    MONO 0.6 07/22/2022    MONO 8.0 07/22/2022    EOS 0.1 07/22/2022    BASO 0.02 07/22/2022    EOSINOPHIL 2.0 07/22/2022    BASOPHIL 0.3 07/22/2022     Sodium   Date Value Ref Range Status   07/22/2022 138 136 - 145 mmol/L Final     Potassium   Date Value Ref Range Status   07/22/2022 3.9 3.5 - 5.1 mmol/L Final     Chloride   Date Value Ref Range Status   07/22/2022 106 95 - 110 mmol/L Final     CO2   Date Value Ref Range Status   07/22/2022 22 (L) 23 - 29 mmol/L Final     Glucose   Date Value Ref Range Status   07/22/2022 89 70 - 110 mg/dL Final     BUN   Date Value Ref Range  Status   07/22/2022 12 6 - 20 mg/dL Final     Creatinine   Date Value Ref Range Status   07/22/2022 0.8 0.5 - 1.4 mg/dL Final     Calcium   Date Value Ref Range Status   07/22/2022 9.7 8.7 - 10.5 mg/dL Final     Total Protein   Date Value Ref Range Status   07/22/2022 7.8 6.0 - 8.4 g/dL Final     Albumin   Date Value Ref Range Status   07/22/2022 3.9 3.5 - 5.2 g/dL Final     Total Bilirubin   Date Value Ref Range Status   07/22/2022 0.3 0.1 - 1.0 mg/dL Final     Comment:     For infants and newborns, interpretation of results should be based  on gestational age, weight and in agreement with clinical  observations.    Premature Infant recommended reference ranges:  Up to 24 hours.............<8.0 mg/dL  Up to 48 hours............<12.0 mg/dL  3-5 days..................<15.0 mg/dL  6-29 days.................<15.0 mg/dL       Alkaline Phosphatase   Date Value Ref Range Status   07/22/2022 97 55 - 135 U/L Final     AST   Date Value Ref Range Status   07/22/2022 21 10 - 40 U/L Final     ALT   Date Value Ref Range Status   07/22/2022 11 10 - 44 U/L Final     Anion Gap   Date Value Ref Range Status   07/22/2022 10 8 - 16 mmol/L Final     eGFR if    Date Value Ref Range Status   07/22/2022 >60.0 >60 mL/min/1.73 m^2 Final     eGFR if non    Date Value Ref Range Status   07/22/2022 >60.0 >60 mL/min/1.73 m^2 Final     Comment:     Calculation used to obtain the estimated glomerular filtration  rate (eGFR) is the CKD-EPI equation.          A/P:    Iron deficiency anemia  -likely due to recent pregnancy  -will give 1 dose of IV Injectafer and then recheck labs 4 weeks later  -patient is currently asymptomatic  -return to clinic in 4 weeks with ferritin and CBC    Aurash Khoobehi, MD  Hematology and Oncology

## 2022-08-12 ENCOUNTER — TELEPHONE (OUTPATIENT)
Dept: CARDIOLOGY | Facility: HOSPITAL | Age: 27
End: 2022-08-12
Payer: MEDICAID

## 2022-08-12 ENCOUNTER — HOSPITAL ENCOUNTER (OUTPATIENT)
Dept: CARDIOLOGY | Facility: HOSPITAL | Age: 27
Discharge: HOME | End: 2022-08-12
Attending: INTERNAL MEDICINE
Payer: MEDICAID

## 2022-08-12 ENCOUNTER — DOCUMENTATION ONLY (OUTPATIENT)
Dept: CARDIOLOGY | Facility: HOSPITAL | Age: 27
End: 2022-08-12
Payer: MEDICAID

## 2022-08-12 DIAGNOSIS — Z82.49 FAMILY HISTORY OF EARLY CAD: ICD-10-CM

## 2022-08-12 DIAGNOSIS — R07.89 OTHER CHEST PAIN: ICD-10-CM

## 2022-08-12 DIAGNOSIS — K21.9 GASTROESOPHAGEAL REFLUX DISEASE, UNSPECIFIED WHETHER ESOPHAGITIS PRESENT: ICD-10-CM

## 2022-08-12 RX ORDER — AMLODIPINE BESYLATE 5 MG/1
5 TABLET ORAL DAILY
Qty: 30 TABLET | Refills: 3 | Status: SHIPPED | OUTPATIENT
Start: 2022-08-12 | End: 2022-08-15

## 2022-08-12 NOTE — PROGRESS NOTES
Patient presented today for ETT. Dr. Clemons notified due to patient presented  with /110, and ekg changes from previous ekg.  Blood pressure rechecked 10 minutes later and remained elevated.    Orders per Dr. Clemons  1. cancel the stress test now with that BP already so high and ECG changes.   2. Start Norvasc 5mg by mouth,  to start today, follow up with me in 3-4 weeks or sooner if needed.   Patient was informed of Dr. Clemons instructions , to start norvasc 5mg by today, and then daily.  Patient was educated on medication is used to treat hig blood pressure. To check and record blood pressure daily. Notifiy MD of low blood pressure, lightheadedness, dizziness or any unusual symptoms.  Patient repeated instructions back correctly.     amLODIPine (NORVASC) 5 MG tablet 30 tablet 3 8/12/2022 8/12/2023 --   Sig - Route: Take 1 tablet (5 mg total) by mouth once daily. - Oral   Sent to pharmacy as: amLODIPine (NORVASC) 5 MG tablet   Class: Normal   Notes to Pharmacy: .   Order: [id number]   Date/Time Signed: 8/12/2022 08:53       E-Prescribing Status: Receipt confirmed by pharmacy (8/12/2022  8:53 AM CDT)         Pharmacy    [id number] - [place] [address] & [facility]

## 2022-08-15 ENCOUNTER — TELEPHONE (OUTPATIENT)
Dept: CARDIOLOGY | Facility: CLINIC | Age: 27
End: 2022-08-15
Payer: MEDICAID

## 2022-08-15 RX ORDER — NIFEDIPINE 30 MG/1
30 TABLET, EXTENDED RELEASE ORAL DAILY
Qty: 30 TABLET | Refills: 3 | Status: SHIPPED | OUTPATIENT
Start: 2022-08-15 | End: 2022-09-23 | Stop reason: SDUPTHER

## 2022-08-15 NOTE — TELEPHONE ENCOUNTER
Per Dr. barnes         OK I changed Norvasc to Nifedipine please tell her to start that and monitor BP and have low salt diet. Thanks     Message text       No answer lvm and portal message to return call

## 2022-08-16 ENCOUNTER — OFFICE VISIT (OUTPATIENT)
Dept: OPHTHALMOLOGY | Facility: CLINIC | Age: 27
End: 2022-08-16
Payer: MEDICAID

## 2022-08-16 DIAGNOSIS — H52.203 MYOPIA OF BOTH EYES WITH ASTIGMATISM: ICD-10-CM

## 2022-08-16 DIAGNOSIS — H52.13 MYOPIA OF BOTH EYES WITH ASTIGMATISM: ICD-10-CM

## 2022-08-16 DIAGNOSIS — Z01.00 ENCOUNTER FOR EYE EXAM: Primary | ICD-10-CM

## 2022-08-16 PROCEDURE — 1160F RVW MEDS BY RX/DR IN RCRD: CPT | Mod: CPTII,,, | Performed by: OPTOMETRIST

## 2022-08-16 PROCEDURE — 92002 INTRM OPH EXAM NEW PATIENT: CPT | Mod: S$PBB,,, | Performed by: OPTOMETRIST

## 2022-08-16 PROCEDURE — 99999 PR PBB SHADOW E&M-EST. PATIENT-LVL II: ICD-10-PCS | Mod: PBBFAC,,, | Performed by: OPTOMETRIST

## 2022-08-16 PROCEDURE — 1159F MED LIST DOCD IN RCRD: CPT | Mod: CPTII,,, | Performed by: OPTOMETRIST

## 2022-08-16 PROCEDURE — 3044F HG A1C LEVEL LT 7.0%: CPT | Mod: CPTII,,, | Performed by: OPTOMETRIST

## 2022-08-16 PROCEDURE — 92015 PR REFRACTION: ICD-10-PCS | Mod: ,,, | Performed by: OPTOMETRIST

## 2022-08-16 PROCEDURE — 99212 OFFICE O/P EST SF 10 MIN: CPT | Mod: PBBFAC,PO | Performed by: OPTOMETRIST

## 2022-08-16 PROCEDURE — 99999 PR PBB SHADOW E&M-EST. PATIENT-LVL II: CPT | Mod: PBBFAC,,, | Performed by: OPTOMETRIST

## 2022-08-16 PROCEDURE — 92002 PR EYE EXAM, NEW PATIENT,INTERMED: ICD-10-PCS | Mod: S$PBB,,, | Performed by: OPTOMETRIST

## 2022-08-16 PROCEDURE — 92015 DETERMINE REFRACTIVE STATE: CPT | Mod: ,,, | Performed by: OPTOMETRIST

## 2022-08-16 PROCEDURE — 3044F PR MOST RECENT HEMOGLOBIN A1C LEVEL <7.0%: ICD-10-PCS | Mod: CPTII,,, | Performed by: OPTOMETRIST

## 2022-08-16 PROCEDURE — 1160F PR REVIEW ALL MEDS BY PRESCRIBER/CLIN PHARMACIST DOCUMENTED: ICD-10-PCS | Mod: CPTII,,, | Performed by: OPTOMETRIST

## 2022-08-16 PROCEDURE — 1159F PR MEDICATION LIST DOCUMENTED IN MEDICAL RECORD: ICD-10-PCS | Mod: CPTII,,, | Performed by: OPTOMETRIST

## 2022-08-16 NOTE — PROGRESS NOTES
HPI     NP to DKT  Patient here today for yearly eye exam  Vision changes since last eye exam?: None noticed  Last eye exam 3-4 years ago     Any eye pain today: No    Other ocular symptoms: No    Interested in contact lens fitting today? No                  Last edited by Rosa Correia, PCT on 8/16/2022 11:07 AM. (History)              Assessment /Plan     For exam results, see Encounter Report.    Encounter for eye exam  Subclinical Rx, MRx provided PRN.   Myopia of both eyes with astigmatism  Eyeglass Final Rx     Eyeglass Final Rx       Sphere Cylinder Axis    Right -0.75 +0.75 178    Left -1.00 +0.25 005    Type: SVL    Expiration Date: 8/16/2023                RTC 1 yr for dilated eye exam or sooner if any changes to vision.   Discussed above and answered questions.

## 2022-08-29 ENCOUNTER — PATIENT MESSAGE (OUTPATIENT)
Dept: HEMATOLOGY/ONCOLOGY | Facility: CLINIC | Age: 27
End: 2022-08-29
Payer: MEDICAID

## 2022-08-30 ENCOUNTER — TELEPHONE (OUTPATIENT)
Dept: HEMATOLOGY/ONCOLOGY | Facility: CLINIC | Age: 27
End: 2022-08-30
Payer: MEDICAID

## 2022-08-30 DIAGNOSIS — D50.9 IRON DEFICIENCY ANEMIA OF MOTHER DURING PREGNANCY: Primary | ICD-10-CM

## 2022-08-30 DIAGNOSIS — O99.019 IRON DEFICIENCY ANEMIA OF MOTHER DURING PREGNANCY: Primary | ICD-10-CM

## 2022-08-30 RX ORDER — SODIUM CHLORIDE 9 MG/ML
INJECTION, SOLUTION INTRAVENOUS CONTINUOUS
Status: CANCELLED | OUTPATIENT
Start: 2022-08-30

## 2022-08-30 RX ORDER — SODIUM CHLORIDE 0.9 % (FLUSH) 0.9 %
10 SYRINGE (ML) INJECTION
Status: CANCELLED | OUTPATIENT
Start: 2022-08-30

## 2022-08-30 RX ORDER — HEPARIN 100 UNIT/ML
500 SYRINGE INTRAVENOUS
Status: CANCELLED | OUTPATIENT
Start: 2022-08-30

## 2022-08-30 RX ORDER — HEPARIN 100 UNIT/ML
5 SYRINGE INTRAVENOUS
Status: CANCELLED | OUTPATIENT
Start: 2022-08-30

## 2022-09-16 ENCOUNTER — INFUSION (OUTPATIENT)
Dept: INFUSION THERAPY | Facility: HOSPITAL | Age: 27
End: 2022-09-16
Attending: FAMILY MEDICINE
Payer: MEDICAID

## 2022-09-16 VITALS
OXYGEN SATURATION: 98 % | TEMPERATURE: 98 F | HEART RATE: 78 BPM | RESPIRATION RATE: 18 BRPM | SYSTOLIC BLOOD PRESSURE: 111 MMHG | DIASTOLIC BLOOD PRESSURE: 66 MMHG

## 2022-09-16 DIAGNOSIS — D50.8 IRON DEFICIENCY ANEMIA SECONDARY TO INADEQUATE DIETARY IRON INTAKE: Primary | ICD-10-CM

## 2022-09-16 PROCEDURE — 25000003 PHARM REV CODE 250: Performed by: INTERNAL MEDICINE

## 2022-09-16 PROCEDURE — 96366 THER/PROPH/DIAG IV INF ADDON: CPT

## 2022-09-16 PROCEDURE — 63600175 PHARM REV CODE 636 W HCPCS: Performed by: INTERNAL MEDICINE

## 2022-09-16 PROCEDURE — 96365 THER/PROPH/DIAG IV INF INIT: CPT

## 2022-09-16 RX ORDER — SODIUM CHLORIDE 0.9 % (FLUSH) 0.9 %
10 SYRINGE (ML) INJECTION
Status: CANCELLED | OUTPATIENT
Start: 2022-09-23

## 2022-09-16 RX ORDER — HEPARIN 100 UNIT/ML
500 SYRINGE INTRAVENOUS
Status: CANCELLED | OUTPATIENT
Start: 2022-09-23

## 2022-09-16 RX ADMIN — SODIUM CHLORIDE: 0.9 INJECTION, SOLUTION INTRAVENOUS at 08:09

## 2022-09-16 RX ADMIN — IRON SUCROSE 300 MG: 20 INJECTION, SOLUTION INTRAVENOUS at 08:09

## 2022-09-16 NOTE — PLAN OF CARE
Discussed plan of care with pt. Addressed any and ongoing concerns. Pt denies    Problem: Adult Inpatient Plan of Care  Goal: Plan of Care Review  Outcome: Ongoing, Progressing  Flowsheets (Taken 9/16/2022 0906)  Plan of Care Reviewed With: patient  Goal: Patient-Specific Goal (Individualized)  Outcome: Ongoing, Progressing  Goal: Absence of Hospital-Acquired Illness or Injury  Outcome: Ongoing, Progressing  Intervention: Identify and Manage Fall Risk  Flowsheets (Taken 9/16/2022 0906)  Safety Promotion/Fall Prevention:   in recliner, wheels locked   room near unit station  Intervention: Prevent Infection  Flowsheets (Taken 9/16/2022 0906)  Infection Prevention:   hand hygiene promoted   equipment surfaces disinfected  Goal: Optimal Comfort and Wellbeing  Outcome: Ongoing, Progressing  Intervention: Monitor Pain and Promote Comfort  Flowsheets (Taken 9/16/2022 0906)  Pain Management Interventions:   warm blanket provided   quiet environment facilitated  Intervention: Provide Person-Centered Care  Flowsheets (Taken 9/16/2022 0906)  Trust Relationship/Rapport:   reassurance provided   care explained   thoughts/feelings acknowledged   choices provided   emotional support provided   empathic listening provided   questions answered   questions encouraged

## 2022-09-23 ENCOUNTER — OFFICE VISIT (OUTPATIENT)
Dept: CARDIOLOGY | Facility: CLINIC | Age: 27
End: 2022-09-23
Payer: MEDICAID

## 2022-09-23 VITALS — HEART RATE: 85 BPM | WEIGHT: 158.75 LBS | HEIGHT: 63 IN | BODY MASS INDEX: 28.13 KG/M2 | OXYGEN SATURATION: 98 %

## 2022-09-23 DIAGNOSIS — R07.89 OTHER CHEST PAIN: ICD-10-CM

## 2022-09-23 DIAGNOSIS — D50.8 IRON DEFICIENCY ANEMIA SECONDARY TO INADEQUATE DIETARY IRON INTAKE: ICD-10-CM

## 2022-09-23 DIAGNOSIS — Z82.49 FAMILY HISTORY OF EARLY CAD: Primary | ICD-10-CM

## 2022-09-23 DIAGNOSIS — K21.9 GASTROESOPHAGEAL REFLUX DISEASE, UNSPECIFIED WHETHER ESOPHAGITIS PRESENT: ICD-10-CM

## 2022-09-23 DIAGNOSIS — I10 PRIMARY HYPERTENSION: ICD-10-CM

## 2022-09-23 PROCEDURE — 3044F PR MOST RECENT HEMOGLOBIN A1C LEVEL <7.0%: ICD-10-PCS | Mod: CPTII,,, | Performed by: INTERNAL MEDICINE

## 2022-09-23 PROCEDURE — 1160F PR REVIEW ALL MEDS BY PRESCRIBER/CLIN PHARMACIST DOCUMENTED: ICD-10-PCS | Mod: CPTII,,, | Performed by: INTERNAL MEDICINE

## 2022-09-23 PROCEDURE — 99214 PR OFFICE/OUTPT VISIT, EST, LEVL IV, 30-39 MIN: ICD-10-PCS | Mod: S$PBB,,, | Performed by: INTERNAL MEDICINE

## 2022-09-23 PROCEDURE — 1159F PR MEDICATION LIST DOCUMENTED IN MEDICAL RECORD: ICD-10-PCS | Mod: CPTII,,, | Performed by: INTERNAL MEDICINE

## 2022-09-23 PROCEDURE — 3008F PR BODY MASS INDEX (BMI) DOCUMENTED: ICD-10-PCS | Mod: CPTII,,, | Performed by: INTERNAL MEDICINE

## 2022-09-23 PROCEDURE — 1160F RVW MEDS BY RX/DR IN RCRD: CPT | Mod: CPTII,,, | Performed by: INTERNAL MEDICINE

## 2022-09-23 PROCEDURE — 99999 PR PBB SHADOW E&M-EST. PATIENT-LVL III: CPT | Mod: PBBFAC,,, | Performed by: INTERNAL MEDICINE

## 2022-09-23 PROCEDURE — 3044F HG A1C LEVEL LT 7.0%: CPT | Mod: CPTII,,, | Performed by: INTERNAL MEDICINE

## 2022-09-23 PROCEDURE — 1159F MED LIST DOCD IN RCRD: CPT | Mod: CPTII,,, | Performed by: INTERNAL MEDICINE

## 2022-09-23 PROCEDURE — 99213 OFFICE O/P EST LOW 20 MIN: CPT | Mod: PBBFAC | Performed by: INTERNAL MEDICINE

## 2022-09-23 PROCEDURE — 99214 OFFICE O/P EST MOD 30 MIN: CPT | Mod: S$PBB,,, | Performed by: INTERNAL MEDICINE

## 2022-09-23 PROCEDURE — 99999 PR PBB SHADOW E&M-EST. PATIENT-LVL III: ICD-10-PCS | Mod: PBBFAC,,, | Performed by: INTERNAL MEDICINE

## 2022-09-23 PROCEDURE — 3008F BODY MASS INDEX DOCD: CPT | Mod: CPTII,,, | Performed by: INTERNAL MEDICINE

## 2022-09-23 RX ORDER — NIFEDIPINE 30 MG/1
30 TABLET, EXTENDED RELEASE ORAL DAILY
Qty: 90 TABLET | Refills: 1 | Status: SHIPPED | OUTPATIENT
Start: 2022-09-23 | End: 2023-09-23

## 2022-09-23 RX ORDER — AMLODIPINE BESYLATE 5 MG/1
5 TABLET ORAL DAILY
COMMUNITY
Start: 2022-09-17 | End: 2022-09-23

## 2022-09-23 NOTE — PROGRESS NOTES
Subjective:   Patient ID:  Laverne Arcos is a 27 y.o. female who presents for cardiac consult of No chief complaint on file.    Referring Physician: Walker Bird MD   [id number] [place] [address]    Reason for consult: CVD risk eval      HPI  The patient came in today for cardiac consult of No chief complaint on file.      Laverne Arcos is a 27 y.o. female pt with HTN, FH CVD, GERD, Fe def anemia, resents for follow up CV eval.     7/15/22  BP today is elevated initially 130/100 --> 130/80s. , BMI 28 - 163 lbs. She has been having CP/reflux sx - started PPI, improved a week ago.     22  Pt started on Nifedipine for BP. Bp has been well controlled now. She has dec salt intake. Iron level was 5, had IV iron.     Patient feels no leg swelling, no PND, no palpitation, no dizziness, no syncope, no CNS symptoms.    Patient has fairly good exercise tolerance.    Patient is compliant with medications.    ECG - sinus tach, V rate 105, nonspec changes     FH - father -  of MI 55; mother - HTN, grandmother -  after stroke, had MI; grandfather - HTN    History reviewed. No pertinent past medical history.    History reviewed. No pertinent surgical history.    Social History     Tobacco Use    Smoking status: Never    Smokeless tobacco: Never   Substance Use Topics    Alcohol use: No    Drug use: No       Family History   Problem Relation Age of Onset    Hypertension Mother     Arthritis Maternal Grandmother     Diabetes Maternal Grandmother     Heart disease Maternal Grandmother     Stroke Maternal Grandmother     Heart attack Father     Cancer Maternal Grandfather        Patient's Medications   New Prescriptions    No medications on file   Previous Medications    AMLODIPINE (NORVASC) 5 MG TABLET    Take 5 mg by mouth once daily.    FLUTICASONE PROPIONATE (FLONASE) 50 MCG/ACTUATION NASAL SPRAY    2 sprays (100 mcg total) by Each Nostril route 2 (two) times daily.     "LEVOCETIRIZINE (XYZAL) 5 MG TABLET    Take 1 tablet (5 mg total) by mouth every evening.    NIFEDIPINE (PROCARDIA-XL) 30 MG (OSM) 24 HR TABLET    Take 1 tablet (30 mg total) by mouth once daily.    PANTOPRAZOLE (PROTONIX) 40 MG TABLET    Take 1 tablet (40 mg total) by mouth once daily.    PRENATAL /IRON/FOLIC ACID (PRENATAL MULTI ORAL)    Take by mouth.   Modified Medications    No medications on file   Discontinued Medications    No medications on file       Review of Systems   Constitutional: Negative.    HENT: Negative.     Eyes: Negative.    Respiratory: Negative.     Cardiovascular:  Positive for chest pain.   Gastrointestinal: Negative.    Genitourinary: Negative.    Musculoskeletal: Negative.    Skin: Negative.    Neurological: Negative.    Endo/Heme/Allergies: Negative.    Psychiatric/Behavioral: Negative.     All 12 systems otherwise negative.    Wt Readings from Last 3 Encounters:   09/23/22 72 kg (158 lb 11.7 oz)   08/05/22 74.4 kg (164 lb)   07/22/22 74.4 kg (164 lb 0.4 oz)     Temp Readings from Last 3 Encounters:   09/16/22 97.5 °F (36.4 °C)   07/22/22 97.6 °F (36.4 °C)   07/20/22 98.7 °F (37.1 °C)     BP Readings from Last 3 Encounters:   09/16/22 111/66   07/22/22 122/82   07/20/22 135/86     Pulse Readings from Last 3 Encounters:   09/23/22 85   09/16/22 78   07/22/22 82       Pulse 85   Ht 5' 3" (1.6 m)   Wt 72 kg (158 lb 11.7 oz)   SpO2 98%   BMI 28.12 kg/m²     Objective:   Physical Exam  Vitals and nursing note reviewed.   Constitutional:       General: She is not in acute distress.     Appearance: She is well-developed. She is not diaphoretic.   HENT:      Head: Normocephalic and atraumatic.      Nose: Nose normal.   Eyes:      General: No scleral icterus.     Conjunctiva/sclera: Conjunctivae normal.   Neck:      Thyroid: No thyromegaly.      Vascular: No JVD.   Cardiovascular:      Rate and Rhythm: Normal rate and regular rhythm.      Heart sounds: S1 normal and S2 normal. No murmur " heard.    No friction rub. No gallop. No S3 or S4 sounds.   Pulmonary:      Effort: Pulmonary effort is normal. No respiratory distress.      Breath sounds: Normal breath sounds. No stridor. No wheezing or rales.   Chest:      Chest wall: No tenderness.   Abdominal:      General: Bowel sounds are normal. There is no distension.      Palpations: Abdomen is soft. There is no mass.      Tenderness: There is no abdominal tenderness. There is no rebound.   Genitourinary:     Comments: Deferred  Musculoskeletal:         General: No tenderness or deformity. Normal range of motion.      Cervical back: Normal range of motion and neck supple.   Lymphadenopathy:      Cervical: No cervical adenopathy.   Skin:     General: Skin is warm and dry.      Coloration: Skin is not pale.      Findings: No erythema or rash.   Neurological:      Mental Status: She is alert and oriented to person, place, and time.      Motor: No abnormal muscle tone.      Coordination: Coordination normal.   Psychiatric:         Behavior: Behavior normal.         Thought Content: Thought content normal.         Judgment: Judgment normal.       Lab Results   Component Value Date     07/22/2022    K 3.9 07/22/2022     07/22/2022    CO2 22 (L) 07/22/2022    BUN 12 07/22/2022    CREATININE 0.8 07/22/2022    GLU 89 07/22/2022    HGBA1C 5.2 07/22/2022    AST 21 07/22/2022    ALT 11 07/22/2022    ALBUMIN 3.9 07/22/2022    PROT 7.8 07/22/2022    BILITOT 0.3 07/22/2022    WBC 6.91 07/22/2022    HGB 11.4 (L) 07/22/2022    HCT 38.9 07/22/2022    MCV 89 07/22/2022     07/22/2022    TSH 1.104 07/22/2022    CHOL 160 07/22/2022    HDL 52 07/22/2022    LDLCALC 101.0 07/22/2022    TRIG 35 07/22/2022     Assessment:      1. Family history of early CAD    2. Gastroesophageal reflux disease, unspecified whether esophagitis present    3. Other chest pain    4. Iron deficiency anemia secondary to inadequate dietary iron intake    5. Primary hypertension           Plan:     1. FH CAD with CP  - order ECG stress test and 2D ECHO  - discussed non cardiac etiologies as well    2. GERD  - cont PPI  - f/u GI    3. HTN  - titrate meds    4. Fe def anemia  - cont iron infusions, f/u hemeonc    Thank you for allowing me to participate in this patient's care. Please do not hesitate to contact me with any questions or concerns. Consult note has been forwarded to the referral physician.

## 2022-10-05 ENCOUNTER — PATIENT MESSAGE (OUTPATIENT)
Dept: HEMATOLOGY/ONCOLOGY | Facility: CLINIC | Age: 27
End: 2022-10-05
Payer: MEDICAID

## 2022-10-07 ENCOUNTER — LAB VISIT (OUTPATIENT)
Dept: LAB | Facility: HOSPITAL | Age: 27
End: 2022-10-07
Attending: FAMILY MEDICINE
Payer: MEDICAID

## 2022-10-07 DIAGNOSIS — D50.9 IRON DEFICIENCY ANEMIA OF MOTHER DURING PREGNANCY: ICD-10-CM

## 2022-10-07 DIAGNOSIS — O99.019 IRON DEFICIENCY ANEMIA OF MOTHER DURING PREGNANCY: ICD-10-CM

## 2022-10-07 PROCEDURE — 85025 COMPLETE CBC W/AUTO DIFF WBC: CPT | Performed by: INTERNAL MEDICINE

## 2022-10-07 PROCEDURE — 36415 COLL VENOUS BLD VENIPUNCTURE: CPT | Mod: PO | Performed by: INTERNAL MEDICINE

## 2022-10-07 PROCEDURE — 82728 ASSAY OF FERRITIN: CPT | Performed by: INTERNAL MEDICINE

## 2022-10-08 LAB
BASOPHILS # BLD AUTO: 0.03 K/UL (ref 0–0.2)
BASOPHILS NFR BLD: 0.5 % (ref 0–1.9)
DIFFERENTIAL METHOD: ABNORMAL
EOSINOPHIL # BLD AUTO: 0.1 K/UL (ref 0–0.5)
EOSINOPHIL NFR BLD: 1.1 % (ref 0–8)
ERYTHROCYTE [DISTWIDTH] IN BLOOD BY AUTOMATED COUNT: 17.2 % (ref 11.5–14.5)
FERRITIN SERPL-MCNC: 57 NG/ML (ref 20–300)
HCT VFR BLD AUTO: 41.9 % (ref 37–48.5)
HGB BLD-MCNC: 12.4 G/DL (ref 12–16)
IMM GRANULOCYTES # BLD AUTO: 0.01 K/UL (ref 0–0.04)
IMM GRANULOCYTES NFR BLD AUTO: 0.2 % (ref 0–0.5)
LYMPHOCYTES # BLD AUTO: 3.1 K/UL (ref 1–4.8)
LYMPHOCYTES NFR BLD: 46.7 % (ref 18–48)
MCH RBC QN AUTO: 28 PG (ref 27–31)
MCHC RBC AUTO-ENTMCNC: 29.6 G/DL (ref 32–36)
MCV RBC AUTO: 95 FL (ref 82–98)
MONOCYTES # BLD AUTO: 0.6 K/UL (ref 0.3–1)
MONOCYTES NFR BLD: 8.4 % (ref 4–15)
NEUTROPHILS # BLD AUTO: 2.8 K/UL (ref 1.8–7.7)
NEUTROPHILS NFR BLD: 43.1 % (ref 38–73)
NRBC BLD-RTO: 0 /100 WBC
PLATELET # BLD AUTO: 327 K/UL (ref 150–450)
PMV BLD AUTO: 11 FL (ref 9.2–12.9)
RBC # BLD AUTO: 4.43 M/UL (ref 4–5.4)
WBC # BLD AUTO: 6.55 K/UL (ref 3.9–12.7)

## 2022-10-13 ENCOUNTER — PATIENT MESSAGE (OUTPATIENT)
Dept: HEMATOLOGY/ONCOLOGY | Facility: CLINIC | Age: 27
End: 2022-10-13

## 2022-10-13 ENCOUNTER — OFFICE VISIT (OUTPATIENT)
Dept: HEMATOLOGY/ONCOLOGY | Facility: CLINIC | Age: 27
End: 2022-10-13
Payer: MEDICAID

## 2022-10-13 DIAGNOSIS — O99.019 IRON DEFICIENCY ANEMIA OF MOTHER DURING PREGNANCY: Primary | ICD-10-CM

## 2022-10-13 DIAGNOSIS — D50.9 IRON DEFICIENCY ANEMIA OF MOTHER DURING PREGNANCY: Primary | ICD-10-CM

## 2022-10-13 PROCEDURE — 1159F PR MEDICATION LIST DOCUMENTED IN MEDICAL RECORD: ICD-10-PCS | Mod: CPTII,95,, | Performed by: INTERNAL MEDICINE

## 2022-10-13 PROCEDURE — 99214 PR OFFICE/OUTPT VISIT, EST, LEVL IV, 30-39 MIN: ICD-10-PCS | Mod: 95,,, | Performed by: INTERNAL MEDICINE

## 2022-10-13 PROCEDURE — 1160F RVW MEDS BY RX/DR IN RCRD: CPT | Mod: CPTII,95,, | Performed by: INTERNAL MEDICINE

## 2022-10-13 PROCEDURE — 1160F PR REVIEW ALL MEDS BY PRESCRIBER/CLIN PHARMACIST DOCUMENTED: ICD-10-PCS | Mod: CPTII,95,, | Performed by: INTERNAL MEDICINE

## 2022-10-13 PROCEDURE — 1159F MED LIST DOCD IN RCRD: CPT | Mod: CPTII,95,, | Performed by: INTERNAL MEDICINE

## 2022-10-13 PROCEDURE — 3044F HG A1C LEVEL LT 7.0%: CPT | Mod: CPTII,95,, | Performed by: INTERNAL MEDICINE

## 2022-10-13 PROCEDURE — 3044F PR MOST RECENT HEMOGLOBIN A1C LEVEL <7.0%: ICD-10-PCS | Mod: CPTII,95,, | Performed by: INTERNAL MEDICINE

## 2022-10-13 PROCEDURE — 99214 OFFICE O/P EST MOD 30 MIN: CPT | Mod: 95,,, | Performed by: INTERNAL MEDICINE

## 2022-10-13 NOTE — PROGRESS NOTES
This was a virtual visit due to patient location in [place], patient was at home.  She consented for this audiovisual visit.    Service Date:  10/13/22    Chief Complaint: Iron def anemia    Laverne Arcos is a 27 y.o. female here to follow-up on iron deficiency.  States she felt much better after the iron infusion.  She did not even realize she was feeling so bad until after the infusion.  She tolerated well with no complaints.    Review of Systems   Constitutional: Negative.    HENT: Negative.     Eyes: Negative.    Respiratory: Negative.     Cardiovascular: Negative.    Gastrointestinal: Negative.    Endocrine: Negative.    Genitourinary: Negative.    Musculoskeletal: Negative.    Integumentary:  Negative.   Neurological: Negative.    Hematological: Negative.    Psychiatric/Behavioral: Negative.        Current Outpatient Medications   Medication Instructions    fluticasone propionate (FLONASE) 100 mcg, Each Nostril, 2 times daily    levocetirizine (XYZAL) 5 mg, Oral, Nightly    NIFEdipine (PROCARDIA-XL) 30 mg, Oral, Daily    pantoprazole (PROTONIX) 40 mg, Oral, Daily    prenatal no122/iron/folic acid (PRENATAL MULTI ORAL) Oral        History reviewed. No pertinent past medical history.     History reviewed. No pertinent surgical history.     Family History   Problem Relation Age of Onset    Hypertension Mother     Arthritis Maternal Grandmother     Diabetes Maternal Grandmother     Heart disease Maternal Grandmother     Stroke Maternal Grandmother     Heart attack Father     Cancer Maternal Grandfather        Social History     Tobacco Use    Smoking status: Never    Smokeless tobacco: Never   Substance Use Topics    Alcohol use: No    Drug use: No         There were no vitals filed for this visit.     Physical Exam:  There were no vitals taken for this visit.    Physical Exam  Constitutional:       Appearance: Normal appearance.   HENT:      Head: Normocephalic and atraumatic.      Nose: Nose  normal.      Mouth/Throat:      Mouth: Mucous membranes are moist.      Pharynx: Oropharynx is clear.   Eyes:      Conjunctiva/sclera: Conjunctivae normal.   Cardiovascular:      Rate and Rhythm: Normal rate and regular rhythm.      Heart sounds: Normal heart sounds.   Pulmonary:      Effort: Pulmonary effort is normal.      Breath sounds: Normal breath sounds.   Abdominal:      General: Abdomen is flat. Bowel sounds are normal.      Palpations: Abdomen is soft.   Musculoskeletal:         General: Normal range of motion.      Cervical back: Normal range of motion and neck supple.   Skin:     General: Skin is warm and dry.   Neurological:      General: No focal deficit present.      Mental Status: She is alert and oriented to person, place, and time. Mental status is at baseline.   Psychiatric:         Mood and Affect: Mood normal.        Labs:  Lab Results   Component Value Date    WBC 6.55 10/07/2022    RBC 4.43 10/07/2022    HGB 12.4 10/07/2022    HCT 41.9 10/07/2022    MCV 95 10/07/2022    MCH 28.0 10/07/2022    MCHC 29.6 (L) 10/07/2022    RDW 17.2 (H) 10/07/2022     10/07/2022    MPV 11.0 10/07/2022    GRAN 2.8 10/07/2022    GRAN 43.1 10/07/2022    LYMPH 3.1 10/07/2022    LYMPH 46.7 10/07/2022    MONO 0.6 10/07/2022    MONO 8.4 10/07/2022    EOS 0.1 10/07/2022    BASO 0.03 10/07/2022    EOSINOPHIL 1.1 10/07/2022    BASOPHIL 0.5 10/07/2022     Sodium   Date Value Ref Range Status   07/22/2022 138 136 - 145 mmol/L Final     Potassium   Date Value Ref Range Status   07/22/2022 3.9 3.5 - 5.1 mmol/L Final     Chloride   Date Value Ref Range Status   07/22/2022 106 95 - 110 mmol/L Final     CO2   Date Value Ref Range Status   07/22/2022 22 (L) 23 - 29 mmol/L Final     Glucose   Date Value Ref Range Status   07/22/2022 89 70 - 110 mg/dL Final     BUN   Date Value Ref Range Status   07/22/2022 12 6 - 20 mg/dL Final     Creatinine   Date Value Ref Range Status   07/22/2022 0.8 0.5 - 1.4 mg/dL Final     Calcium    Date Value Ref Range Status   07/22/2022 9.7 8.7 - 10.5 mg/dL Final     Total Protein   Date Value Ref Range Status   07/22/2022 7.8 6.0 - 8.4 g/dL Final     Albumin   Date Value Ref Range Status   07/22/2022 3.9 3.5 - 5.2 g/dL Final     Total Bilirubin   Date Value Ref Range Status   07/22/2022 0.3 0.1 - 1.0 mg/dL Final     Comment:     For infants and newborns, interpretation of results should be based  on gestational age, weight and in agreement with clinical  observations.    Premature Infant recommended reference ranges:  Up to 24 hours.............<8.0 mg/dL  Up to 48 hours............<12.0 mg/dL  3-5 days..................<15.0 mg/dL  6-29 days.................<15.0 mg/dL       Alkaline Phosphatase   Date Value Ref Range Status   07/22/2022 97 55 - 135 U/L Final     AST   Date Value Ref Range Status   07/22/2022 21 10 - 40 U/L Final     ALT   Date Value Ref Range Status   07/22/2022 11 10 - 44 U/L Final     Anion Gap   Date Value Ref Range Status   07/22/2022 10 8 - 16 mmol/L Final     eGFR if    Date Value Ref Range Status   07/22/2022 >60.0 >60 mL/min/1.73 m^2 Final     eGFR if non    Date Value Ref Range Status   07/22/2022 >60.0 >60 mL/min/1.73 m^2 Final     Comment:     Calculation used to obtain the estimated glomerular filtration  rate (eGFR) is the CKD-EPI equation.          A/P:    Iron deficiency anemia  -likely due to recent pregnancy  -tolerated IV iron with improvement in symptoms  -recheck in 6 months and if okay, will discharge from clinic.      Aurash Khoobehi, MD  Hematology and Oncology

## 2022-10-24 PROBLEM — Z00.00 WELLNESS EXAMINATION: Status: RESOLVED | Noted: 2022-07-21 | Resolved: 2022-10-24

## 2023-02-20 DIAGNOSIS — R07.89 OTHER CHEST PAIN: Primary | ICD-10-CM

## 2023-04-10 ENCOUNTER — TELEPHONE (OUTPATIENT)
Dept: HEMATOLOGY/ONCOLOGY | Facility: CLINIC | Age: 28
End: 2023-04-10
Payer: MEDICAID

## 2023-04-12 ENCOUNTER — PATIENT MESSAGE (OUTPATIENT)
Dept: HEMATOLOGY/ONCOLOGY | Facility: CLINIC | Age: 28
End: 2023-04-12
Payer: MEDICAID

## 2023-04-24 PROBLEM — Z00.00 WELLNESS EXAMINATION: Status: RESOLVED | Noted: 2022-07-21 | Resolved: 2023-04-24

## 2023-04-27 ENCOUNTER — LAB VISIT (OUTPATIENT)
Dept: LAB | Facility: HOSPITAL | Age: 28
End: 2023-04-27
Attending: INTERNAL MEDICINE
Payer: MEDICAID

## 2023-04-27 DIAGNOSIS — O99.019 IRON DEFICIENCY ANEMIA OF MOTHER DURING PREGNANCY: ICD-10-CM

## 2023-04-27 DIAGNOSIS — D50.9 IRON DEFICIENCY ANEMIA OF MOTHER DURING PREGNANCY: ICD-10-CM

## 2023-04-27 LAB
BASOPHILS # BLD AUTO: 0.02 K/UL (ref 0–0.2)
BASOPHILS NFR BLD: 0.4 % (ref 0–1.9)
DIFFERENTIAL METHOD: ABNORMAL
EOSINOPHIL # BLD AUTO: 0.2 K/UL (ref 0–0.5)
EOSINOPHIL NFR BLD: 3.4 % (ref 0–8)
ERYTHROCYTE [DISTWIDTH] IN BLOOD BY AUTOMATED COUNT: 12.7 % (ref 11.5–14.5)
FERRITIN SERPL-MCNC: 20 NG/ML (ref 20–300)
HCT VFR BLD AUTO: 41.1 % (ref 37–48.5)
HGB BLD-MCNC: 13.2 G/DL (ref 12–16)
IMM GRANULOCYTES # BLD AUTO: 0.01 K/UL (ref 0–0.04)
IMM GRANULOCYTES NFR BLD AUTO: 0.2 % (ref 0–0.5)
LYMPHOCYTES # BLD AUTO: 2.8 K/UL (ref 1–4.8)
LYMPHOCYTES NFR BLD: 53.7 % (ref 18–48)
MCH RBC QN AUTO: 30.9 PG (ref 27–31)
MCHC RBC AUTO-ENTMCNC: 32.1 G/DL (ref 32–36)
MCV RBC AUTO: 96 FL (ref 82–98)
MONOCYTES # BLD AUTO: 0.6 K/UL (ref 0.3–1)
MONOCYTES NFR BLD: 10.7 % (ref 4–15)
NEUTROPHILS # BLD AUTO: 1.7 K/UL (ref 1.8–7.7)
NEUTROPHILS NFR BLD: 31.6 % (ref 38–73)
NRBC BLD-RTO: 0 /100 WBC
PLATELET # BLD AUTO: 301 K/UL (ref 150–450)
PMV BLD AUTO: 11 FL (ref 9.2–12.9)
RBC # BLD AUTO: 4.27 M/UL (ref 4–5.4)
WBC # BLD AUTO: 5.25 K/UL (ref 3.9–12.7)

## 2023-04-27 PROCEDURE — 85025 COMPLETE CBC W/AUTO DIFF WBC: CPT | Performed by: INTERNAL MEDICINE

## 2023-04-27 PROCEDURE — 82728 ASSAY OF FERRITIN: CPT | Performed by: INTERNAL MEDICINE

## 2023-04-27 PROCEDURE — 36415 COLL VENOUS BLD VENIPUNCTURE: CPT | Mod: PO | Performed by: INTERNAL MEDICINE

## 2023-04-28 ENCOUNTER — OFFICE VISIT (OUTPATIENT)
Dept: HEMATOLOGY/ONCOLOGY | Facility: CLINIC | Age: 28
End: 2023-04-28
Payer: MEDICAID

## 2023-04-28 ENCOUNTER — TELEPHONE (OUTPATIENT)
Dept: HEMATOLOGY/ONCOLOGY | Facility: CLINIC | Age: 28
End: 2023-04-28
Payer: MEDICAID

## 2023-04-28 DIAGNOSIS — D50.9 IRON DEFICIENCY ANEMIA OF MOTHER DURING PREGNANCY: Primary | ICD-10-CM

## 2023-04-28 DIAGNOSIS — O99.019 IRON DEFICIENCY ANEMIA OF MOTHER DURING PREGNANCY: Primary | ICD-10-CM

## 2023-04-28 PROCEDURE — 99214 PR OFFICE/OUTPT VISIT, EST, LEVL IV, 30-39 MIN: ICD-10-PCS | Mod: 95,,, | Performed by: INTERNAL MEDICINE

## 2023-04-28 PROCEDURE — 1159F MED LIST DOCD IN RCRD: CPT | Mod: CPTII,95,, | Performed by: INTERNAL MEDICINE

## 2023-04-28 PROCEDURE — 1160F PR REVIEW ALL MEDS BY PRESCRIBER/CLIN PHARMACIST DOCUMENTED: ICD-10-PCS | Mod: CPTII,95,, | Performed by: INTERNAL MEDICINE

## 2023-04-28 PROCEDURE — 1160F RVW MEDS BY RX/DR IN RCRD: CPT | Mod: CPTII,95,, | Performed by: INTERNAL MEDICINE

## 2023-04-28 PROCEDURE — 1159F PR MEDICATION LIST DOCUMENTED IN MEDICAL RECORD: ICD-10-PCS | Mod: CPTII,95,, | Performed by: INTERNAL MEDICINE

## 2023-04-28 PROCEDURE — 99214 OFFICE O/P EST MOD 30 MIN: CPT | Mod: 95,,, | Performed by: INTERNAL MEDICINE

## 2023-04-28 NOTE — PROGRESS NOTES
This was a virtual visit due to patient location in [place], patient was at home.  She consented for this audiovisual visit.    Service Date:  4/28/23    Chief Complaint: Iron def anemia    Laverne Arcos is a 27 y.o. female here to follow-up on iron deficiency.  States she felt much better after the iron infusion.  On repeat blood work today her ferritin is again low at 20.  She is not anemic.  She does have some fatigue.    Review of Systems   Constitutional:  Positive for fatigue.   HENT: Negative.     Eyes: Negative.    Respiratory: Negative.     Cardiovascular: Negative.    Gastrointestinal: Negative.    Endocrine: Negative.    Genitourinary: Negative.    Musculoskeletal: Negative.    Integumentary:  Negative.   Neurological: Negative.    Hematological: Negative.    Psychiatric/Behavioral: Negative.        Current Outpatient Medications   Medication Instructions    fluticasone propionate (FLONASE) 100 mcg, Each Nostril, 2 times daily    levocetirizine (XYZAL) 5 mg, Oral, Nightly    NIFEdipine (PROCARDIA-XL) 30 mg, Oral, Daily    pantoprazole (PROTONIX) 40 mg, Oral, Daily    prenatal no122/iron/folic acid (PRENATAL MULTI ORAL) Oral        No past medical history on file.     No past surgical history on file.     Family History   Problem Relation Age of Onset    Hypertension Mother     Arthritis Maternal Grandmother     Diabetes Maternal Grandmother     Heart disease Maternal Grandmother     Stroke Maternal Grandmother     Heart attack Father     Cancer Maternal Grandfather        Social History     Tobacco Use    Smoking status: Never    Smokeless tobacco: Never   Substance Use Topics    Alcohol use: No    Drug use: No         There were no vitals filed for this visit.     Physical Exam:  There were no vitals taken for this visit.    Physical Exam  Constitutional:       Appearance: Normal appearance.   HENT:      Head: Normocephalic and atraumatic.      Nose: Nose normal.      Mouth/Throat:       Mouth: Mucous membranes are moist.      Pharynx: Oropharynx is clear.   Eyes:      Conjunctiva/sclera: Conjunctivae normal.   Cardiovascular:      Rate and Rhythm: Normal rate and regular rhythm.      Heart sounds: Normal heart sounds.   Pulmonary:      Effort: Pulmonary effort is normal.      Breath sounds: Normal breath sounds.   Abdominal:      General: Abdomen is flat. Bowel sounds are normal.      Palpations: Abdomen is soft.   Musculoskeletal:         General: Normal range of motion.      Cervical back: Normal range of motion and neck supple.   Skin:     General: Skin is warm and dry.   Neurological:      General: No focal deficit present.      Mental Status: She is alert and oriented to person, place, and time. Mental status is at baseline.   Psychiatric:         Mood and Affect: Mood normal.        Labs:  Lab Results   Component Value Date    WBC 5.25 04/27/2023    RBC 4.27 04/27/2023    HGB 13.2 04/27/2023    HCT 41.1 04/27/2023    MCV 96 04/27/2023    MCH 30.9 04/27/2023    MCHC 32.1 04/27/2023    RDW 12.7 04/27/2023     04/27/2023    MPV 11.0 04/27/2023    GRAN 1.7 (L) 04/27/2023    GRAN 31.6 (L) 04/27/2023    LYMPH 2.8 04/27/2023    LYMPH 53.7 (H) 04/27/2023    MONO 0.6 04/27/2023    MONO 10.7 04/27/2023    EOS 0.2 04/27/2023    BASO 0.02 04/27/2023    EOSINOPHIL 3.4 04/27/2023    BASOPHIL 0.4 04/27/2023     Sodium   Date Value Ref Range Status   07/22/2022 138 136 - 145 mmol/L Final     Potassium   Date Value Ref Range Status   07/22/2022 3.9 3.5 - 5.1 mmol/L Final     Chloride   Date Value Ref Range Status   07/22/2022 106 95 - 110 mmol/L Final     CO2   Date Value Ref Range Status   07/22/2022 22 (L) 23 - 29 mmol/L Final     Glucose   Date Value Ref Range Status   07/22/2022 89 70 - 110 mg/dL Final     BUN   Date Value Ref Range Status   07/22/2022 12 6 - 20 mg/dL Final     Creatinine   Date Value Ref Range Status   07/22/2022 0.8 0.5 - 1.4 mg/dL Final     Calcium   Date Value Ref Range Status    07/22/2022 9.7 8.7 - 10.5 mg/dL Final     Total Protein   Date Value Ref Range Status   07/22/2022 7.8 6.0 - 8.4 g/dL Final     Albumin   Date Value Ref Range Status   07/22/2022 3.9 3.5 - 5.2 g/dL Final     Total Bilirubin   Date Value Ref Range Status   07/22/2022 0.3 0.1 - 1.0 mg/dL Final     Comment:     For infants and newborns, interpretation of results should be based  on gestational age, weight and in agreement with clinical  observations.    Premature Infant recommended reference ranges:  Up to 24 hours.............<8.0 mg/dL  Up to 48 hours............<12.0 mg/dL  3-5 days..................<15.0 mg/dL  6-29 days.................<15.0 mg/dL       Alkaline Phosphatase   Date Value Ref Range Status   07/22/2022 97 55 - 135 U/L Final     AST   Date Value Ref Range Status   07/22/2022 21 10 - 40 U/L Final     ALT   Date Value Ref Range Status   07/22/2022 11 10 - 44 U/L Final     Anion Gap   Date Value Ref Range Status   07/22/2022 10 8 - 16 mmol/L Final     eGFR if    Date Value Ref Range Status   07/22/2022 >60.0 >60 mL/min/1.73 m^2 Final     eGFR if non    Date Value Ref Range Status   07/22/2022 >60.0 >60 mL/min/1.73 m^2 Final     Comment:     Calculation used to obtain the estimated glomerular filtration  rate (eGFR) is the CKD-EPI equation.          A/P:    Iron deficiency anemia  -likely due to recent pregnancy which she has had her delivery since   -I will give her 2 doses of IV Venofer as she only got 1 dose previously and she is needing another now.  This should give her enough for storage.    -return to clinic in 6 months with labs      Aurash Khoobehi, MD  Hematology and Oncology

## 2023-05-04 RX ORDER — HEPARIN 100 UNIT/ML
500 SYRINGE INTRAVENOUS
Status: CANCELLED | OUTPATIENT
Start: 2023-05-05

## 2023-05-04 RX ORDER — SODIUM CHLORIDE 0.9 % (FLUSH) 0.9 %
10 SYRINGE (ML) INJECTION
Status: CANCELLED | OUTPATIENT
Start: 2023-05-05

## 2023-05-05 ENCOUNTER — INFUSION (OUTPATIENT)
Dept: INFUSION THERAPY | Facility: HOSPITAL | Age: 28
End: 2023-05-05
Attending: INTERNAL MEDICINE
Payer: MEDICAID

## 2023-05-05 VITALS
HEART RATE: 78 BPM | DIASTOLIC BLOOD PRESSURE: 79 MMHG | OXYGEN SATURATION: 96 % | TEMPERATURE: 98 F | RESPIRATION RATE: 18 BRPM | SYSTOLIC BLOOD PRESSURE: 124 MMHG

## 2023-05-05 DIAGNOSIS — D50.8 IRON DEFICIENCY ANEMIA SECONDARY TO INADEQUATE DIETARY IRON INTAKE: Primary | ICD-10-CM

## 2023-05-05 PROCEDURE — 63600175 PHARM REV CODE 636 W HCPCS

## 2023-05-05 PROCEDURE — 25000003 PHARM REV CODE 250

## 2023-05-05 PROCEDURE — 96365 THER/PROPH/DIAG IV INF INIT: CPT

## 2023-05-05 PROCEDURE — 96366 THER/PROPH/DIAG IV INF ADDON: CPT

## 2023-05-05 RX ORDER — HEPARIN 100 UNIT/ML
500 SYRINGE INTRAVENOUS
Status: CANCELLED | OUTPATIENT
Start: 2023-05-12

## 2023-05-05 RX ORDER — SODIUM CHLORIDE 0.9 % (FLUSH) 0.9 %
10 SYRINGE (ML) INJECTION
Status: CANCELLED | OUTPATIENT
Start: 2023-05-12

## 2023-05-05 RX ADMIN — IRON SUCROSE 300 MG: 20 INJECTION, SOLUTION INTRAVENOUS at 08:05

## 2023-05-05 NOTE — PLAN OF CARE
Patient reports feeling tired. Tolerated iron infusion well with no adverse reactions. Patient to return in 1 week for next treatment.

## 2023-05-12 ENCOUNTER — INFUSION (OUTPATIENT)
Dept: INFUSION THERAPY | Facility: HOSPITAL | Age: 28
End: 2023-05-12
Attending: FAMILY MEDICINE
Payer: MEDICAID

## 2023-05-12 VITALS
HEART RATE: 81 BPM | SYSTOLIC BLOOD PRESSURE: 124 MMHG | RESPIRATION RATE: 16 BRPM | TEMPERATURE: 97 F | DIASTOLIC BLOOD PRESSURE: 84 MMHG | OXYGEN SATURATION: 98 %

## 2023-05-12 DIAGNOSIS — D50.8 IRON DEFICIENCY ANEMIA SECONDARY TO INADEQUATE DIETARY IRON INTAKE: Primary | ICD-10-CM

## 2023-05-12 PROCEDURE — 96366 THER/PROPH/DIAG IV INF ADDON: CPT

## 2023-05-12 PROCEDURE — 63600175 PHARM REV CODE 636 W HCPCS

## 2023-05-12 PROCEDURE — 96365 THER/PROPH/DIAG IV INF INIT: CPT

## 2023-05-12 PROCEDURE — 25000003 PHARM REV CODE 250

## 2023-05-12 RX ORDER — SODIUM CHLORIDE 0.9 % (FLUSH) 0.9 %
10 SYRINGE (ML) INJECTION
OUTPATIENT
Start: 2023-05-19

## 2023-05-12 RX ORDER — HEPARIN 100 UNIT/ML
500 SYRINGE INTRAVENOUS
OUTPATIENT
Start: 2023-05-19

## 2023-05-12 RX ADMIN — IRON SUCROSE 300 MG: 20 INJECTION, SOLUTION INTRAVENOUS at 09:05

## 2023-05-12 NOTE — PLAN OF CARE
Plan of care reviewed with patient. Discussed if there are any new or ongoing concerns. Denies.    Problem: Adult Inpatient Plan of Care  Goal: Plan of Care Review  Description: Patient likes feet elevated, pillow, blanket, snacks, and juice  Outcome: Ongoing, Progressing  Goal: Patient-Specific Goal (Individualized)  Description: Patient tolerated infusion well with no adverse reactions.   Outcome: Ongoing, Progressing  Goal: Absence of Hospital-Acquired Illness or Injury  Outcome: Ongoing, Progressing  Intervention: Identify and Manage Fall Risk  Flowsheets (Taken 5/12/2023 1012)  Safety Promotion/Fall Prevention: in recliner, wheels locked  Goal: Optimal Comfort and Wellbeing  Outcome: Ongoing, Progressing  Intervention: Provide Person-Centered Care  Flowsheets (Taken 5/12/2023 1012)  Trust Relationship/Rapport:   care explained   reassurance provided   choices provided   thoughts/feelings acknowledged   emotional support provided   empathic listening provided   questions answered   questions encouraged

## 2023-06-07 DIAGNOSIS — R07.89 OTHER CHEST PAIN: Primary | ICD-10-CM

## 2023-10-12 ENCOUNTER — LAB VISIT (OUTPATIENT)
Dept: LAB | Facility: HOSPITAL | Age: 28
End: 2023-10-12
Attending: INTERNAL MEDICINE
Payer: MEDICAID

## 2023-10-12 DIAGNOSIS — O99.019 IRON DEFICIENCY ANEMIA OF MOTHER DURING PREGNANCY: ICD-10-CM

## 2023-10-12 DIAGNOSIS — D50.9 IRON DEFICIENCY ANEMIA OF MOTHER DURING PREGNANCY: ICD-10-CM

## 2023-10-12 LAB
BASOPHILS # BLD AUTO: 0.02 K/UL (ref 0–0.2)
BASOPHILS NFR BLD: 0.4 % (ref 0–1.9)
DIFFERENTIAL METHOD: ABNORMAL
EOSINOPHIL # BLD AUTO: 0.3 K/UL (ref 0–0.5)
EOSINOPHIL NFR BLD: 4.8 % (ref 0–8)
ERYTHROCYTE [DISTWIDTH] IN BLOOD BY AUTOMATED COUNT: 12.4 % (ref 11.5–14.5)
FERRITIN SERPL-MCNC: 52 NG/ML (ref 20–300)
HCT VFR BLD AUTO: 42.4 % (ref 37–48.5)
HGB BLD-MCNC: 13.4 G/DL (ref 12–16)
IMM GRANULOCYTES # BLD AUTO: 0.01 K/UL (ref 0–0.04)
IMM GRANULOCYTES NFR BLD AUTO: 0.2 % (ref 0–0.5)
LYMPHOCYTES # BLD AUTO: 3.2 K/UL (ref 1–4.8)
LYMPHOCYTES NFR BLD: 56.1 % (ref 18–48)
MCH RBC QN AUTO: 30.2 PG (ref 27–31)
MCHC RBC AUTO-ENTMCNC: 31.6 G/DL (ref 32–36)
MCV RBC AUTO: 96 FL (ref 82–98)
MONOCYTES # BLD AUTO: 0.5 K/UL (ref 0.3–1)
MONOCYTES NFR BLD: 7.9 % (ref 4–15)
NEUTROPHILS # BLD AUTO: 1.7 K/UL (ref 1.8–7.7)
NEUTROPHILS NFR BLD: 30.6 % (ref 38–73)
NRBC BLD-RTO: 0 /100 WBC
PLATELET # BLD AUTO: 251 K/UL (ref 150–450)
PMV BLD AUTO: 11.4 FL (ref 9.2–12.9)
RBC # BLD AUTO: 4.44 M/UL (ref 4–5.4)
WBC # BLD AUTO: 5.67 K/UL (ref 3.9–12.7)

## 2023-10-12 PROCEDURE — 82728 ASSAY OF FERRITIN: CPT | Performed by: INTERNAL MEDICINE

## 2023-10-12 PROCEDURE — 36415 COLL VENOUS BLD VENIPUNCTURE: CPT | Mod: PO | Performed by: INTERNAL MEDICINE

## 2023-10-12 PROCEDURE — 85025 COMPLETE CBC W/AUTO DIFF WBC: CPT | Performed by: INTERNAL MEDICINE

## 2023-10-27 ENCOUNTER — PATIENT MESSAGE (OUTPATIENT)
Dept: HEMATOLOGY/ONCOLOGY | Facility: CLINIC | Age: 28
End: 2023-10-27

## 2023-10-27 ENCOUNTER — OFFICE VISIT (OUTPATIENT)
Dept: HEMATOLOGY/ONCOLOGY | Facility: CLINIC | Age: 28
End: 2023-10-27
Payer: MEDICAID

## 2023-10-27 DIAGNOSIS — D50.0 IRON DEFICIENCY ANEMIA DUE TO CHRONIC BLOOD LOSS: Primary | ICD-10-CM

## 2023-10-27 PROCEDURE — 99214 OFFICE O/P EST MOD 30 MIN: CPT | Mod: 95,,, | Performed by: INTERNAL MEDICINE

## 2023-10-27 PROCEDURE — 99214 PR OFFICE/OUTPT VISIT, EST, LEVL IV, 30-39 MIN: ICD-10-PCS | Mod: 95,,, | Performed by: INTERNAL MEDICINE

## 2023-10-27 NOTE — PROGRESS NOTES
This was a virtual visit due to patient location in [place], patient was at home.  She consented for this audiovisual visit.    Service Date:  10/27/23    Chief Complaint: Iron def anemia    Laverne Arcos is a 28 y.o. female here to follow-up on iron deficiency.  Doing well.  Had IV iron about 6 months ago.  Was initially iron deficient due to pregnancy, but now having menorrhagia.    Review of Systems   Constitutional:  Positive for fatigue.   HENT: Negative.     Eyes: Negative.    Respiratory: Negative.     Cardiovascular: Negative.    Gastrointestinal: Negative.    Endocrine: Negative.    Genitourinary: Negative.    Musculoskeletal: Negative.    Integumentary:  Negative.   Neurological: Negative.    Hematological: Negative.    Psychiatric/Behavioral: Negative.          Current Outpatient Medications   Medication Instructions    fluticasone propionate (FLONASE) 100 mcg, Each Nostril, 2 times daily    levocetirizine (XYZAL) 5 mg, Oral, Nightly    NIFEdipine (PROCARDIA-XL) 30 mg, Oral, Daily    pantoprazole (PROTONIX) 40 mg, Oral, Daily    prenatal no122/iron/folic acid (PRENATAL MULTI ORAL) Oral        No past medical history on file.     No past surgical history on file.     Family History   Problem Relation Age of Onset    Hypertension Mother     Arthritis Maternal Grandmother     Diabetes Maternal Grandmother     Heart disease Maternal Grandmother     Stroke Maternal Grandmother     Heart attack Father     Cancer Maternal Grandfather        Social History     Tobacco Use    Smoking status: Never    Smokeless tobacco: Never   Substance Use Topics    Alcohol use: No    Drug use: No         There were no vitals filed for this visit.     Physical Exam:  There were no vitals taken for this visit.    Physical Exam  Constitutional:       Appearance: Normal appearance.   HENT:      Head: Normocephalic and atraumatic.      Nose: Nose normal.      Mouth/Throat:      Mouth: Mucous membranes are moist.       Pharynx: Oropharynx is clear.   Eyes:      Conjunctiva/sclera: Conjunctivae normal.   Cardiovascular:      Rate and Rhythm: Normal rate and regular rhythm.      Heart sounds: Normal heart sounds.   Pulmonary:      Effort: Pulmonary effort is normal.      Breath sounds: Normal breath sounds.   Abdominal:      General: Abdomen is flat. Bowel sounds are normal.      Palpations: Abdomen is soft.   Musculoskeletal:         General: Normal range of motion.      Cervical back: Normal range of motion and neck supple.   Skin:     General: Skin is warm and dry.   Neurological:      General: No focal deficit present.      Mental Status: She is alert and oriented to person, place, and time. Mental status is at baseline.   Psychiatric:         Mood and Affect: Mood normal.          Labs:  Lab Results   Component Value Date    WBC 5.67 10/12/2023    RBC 4.44 10/12/2023    HGB 13.4 10/12/2023    HCT 42.4 10/12/2023    MCV 96 10/12/2023    MCH 30.2 10/12/2023    MCHC 31.6 (L) 10/12/2023    RDW 12.4 10/12/2023     10/12/2023    MPV 11.4 10/12/2023    GRAN 1.7 (L) 10/12/2023    GRAN 30.6 (L) 10/12/2023    LYMPH 3.2 10/12/2023    LYMPH 56.1 (H) 10/12/2023    MONO 0.5 10/12/2023    MONO 7.9 10/12/2023    EOS 0.3 10/12/2023    BASO 0.02 10/12/2023    EOSINOPHIL 4.8 10/12/2023    BASOPHIL 0.4 10/12/2023     Sodium   Date Value Ref Range Status   07/22/2022 138 136 - 145 mmol/L Final     Potassium   Date Value Ref Range Status   07/22/2022 3.9 3.5 - 5.1 mmol/L Final     Chloride   Date Value Ref Range Status   07/22/2022 106 95 - 110 mmol/L Final     CO2   Date Value Ref Range Status   07/22/2022 22 (L) 23 - 29 mmol/L Final     Glucose   Date Value Ref Range Status   07/22/2022 89 70 - 110 mg/dL Final     BUN   Date Value Ref Range Status   07/22/2022 12 6 - 20 mg/dL Final     Creatinine   Date Value Ref Range Status   07/22/2022 0.8 0.5 - 1.4 mg/dL Final     Calcium   Date Value Ref Range Status   07/22/2022 9.7 8.7 - 10.5 mg/dL  Final     Total Protein   Date Value Ref Range Status   07/22/2022 7.8 6.0 - 8.4 g/dL Final     Albumin   Date Value Ref Range Status   07/22/2022 3.9 3.5 - 5.2 g/dL Final     Total Bilirubin   Date Value Ref Range Status   07/22/2022 0.3 0.1 - 1.0 mg/dL Final     Comment:     For infants and newborns, interpretation of results should be based  on gestational age, weight and in agreement with clinical  observations.    Premature Infant recommended reference ranges:  Up to 24 hours.............<8.0 mg/dL  Up to 48 hours............<12.0 mg/dL  3-5 days..................<15.0 mg/dL  6-29 days.................<15.0 mg/dL       Alkaline Phosphatase   Date Value Ref Range Status   07/22/2022 97 55 - 135 U/L Final     AST   Date Value Ref Range Status   07/22/2022 21 10 - 40 U/L Final     ALT   Date Value Ref Range Status   07/22/2022 11 10 - 44 U/L Final     Anion Gap   Date Value Ref Range Status   07/22/2022 10 8 - 16 mmol/L Final     eGFR if    Date Value Ref Range Status   07/22/2022 >60.0 >60 mL/min/1.73 m^2 Final     eGFR if non    Date Value Ref Range Status   07/22/2022 >60.0 >60 mL/min/1.73 m^2 Final     Comment:     Calculation used to obtain the estimated glomerular filtration  rate (eGFR) is the CKD-EPI equation.          A/P:    Iron deficiency anemia  -initially due to recent pregnancy, but now menorrhagia  -last iron infusion about 6 months ago.  No need for iron today.  Will recheck in 6 months.  If okay at that time.  Can discharged from clinic.      Aurash Khoobehi, MD  Hematology and Oncology

## 2024-04-12 ENCOUNTER — LAB VISIT (OUTPATIENT)
Dept: LAB | Facility: HOSPITAL | Age: 29
End: 2024-04-12
Attending: INTERNAL MEDICINE
Payer: COMMERCIAL

## 2024-04-12 DIAGNOSIS — D50.0 IRON DEFICIENCY ANEMIA DUE TO CHRONIC BLOOD LOSS: ICD-10-CM

## 2024-04-12 LAB
BASOPHILS # BLD AUTO: 0.02 K/UL (ref 0–0.2)
BASOPHILS NFR BLD: 0.4 % (ref 0–1.9)
DIFFERENTIAL METHOD BLD: ABNORMAL
EOSINOPHIL # BLD AUTO: 0.1 K/UL (ref 0–0.5)
EOSINOPHIL NFR BLD: 1.2 % (ref 0–8)
ERYTHROCYTE [DISTWIDTH] IN BLOOD BY AUTOMATED COUNT: 12.4 % (ref 11.5–14.5)
FERRITIN SERPL-MCNC: 49 NG/ML (ref 20–300)
HCT VFR BLD AUTO: 42 % (ref 37–48.5)
HGB BLD-MCNC: 13.4 G/DL (ref 12–16)
IMM GRANULOCYTES # BLD AUTO: 0 K/UL (ref 0–0.04)
IMM GRANULOCYTES NFR BLD AUTO: 0 % (ref 0–0.5)
LYMPHOCYTES # BLD AUTO: 2.8 K/UL (ref 1–4.8)
LYMPHOCYTES NFR BLD: 57.5 % (ref 18–48)
MCH RBC QN AUTO: 30.7 PG (ref 27–31)
MCHC RBC AUTO-ENTMCNC: 31.9 G/DL (ref 32–36)
MCV RBC AUTO: 96 FL (ref 82–98)
MONOCYTES # BLD AUTO: 0.4 K/UL (ref 0.3–1)
MONOCYTES NFR BLD: 8.7 % (ref 4–15)
NEUTROPHILS # BLD AUTO: 1.6 K/UL (ref 1.8–7.7)
NEUTROPHILS NFR BLD: 32.2 % (ref 38–73)
NRBC BLD-RTO: 0 /100 WBC
PLATELET # BLD AUTO: 245 K/UL (ref 150–450)
PMV BLD AUTO: 12 FL (ref 9.2–12.9)
RBC # BLD AUTO: 4.36 M/UL (ref 4–5.4)
WBC # BLD AUTO: 4.94 K/UL (ref 3.9–12.7)

## 2024-04-12 PROCEDURE — 82728 ASSAY OF FERRITIN: CPT | Performed by: INTERNAL MEDICINE

## 2024-04-12 PROCEDURE — 36415 COLL VENOUS BLD VENIPUNCTURE: CPT | Mod: PO | Performed by: INTERNAL MEDICINE

## 2024-04-12 PROCEDURE — 85025 COMPLETE CBC W/AUTO DIFF WBC: CPT | Performed by: INTERNAL MEDICINE

## 2024-04-24 ENCOUNTER — OFFICE VISIT (OUTPATIENT)
Dept: HEMATOLOGY/ONCOLOGY | Facility: CLINIC | Age: 29
End: 2024-04-24
Payer: COMMERCIAL

## 2024-04-24 DIAGNOSIS — O99.019 IRON DEFICIENCY ANEMIA OF MOTHER DURING PREGNANCY: ICD-10-CM

## 2024-04-24 DIAGNOSIS — D50.9 IRON DEFICIENCY ANEMIA OF MOTHER DURING PREGNANCY: ICD-10-CM

## 2024-04-24 DIAGNOSIS — D50.0 IRON DEFICIENCY ANEMIA DUE TO CHRONIC BLOOD LOSS: Primary | ICD-10-CM

## 2024-04-24 PROCEDURE — 99213 OFFICE O/P EST LOW 20 MIN: CPT | Mod: 95,,, | Performed by: INTERNAL MEDICINE

## 2024-04-24 NOTE — PROGRESS NOTES
This was a virtual visit due to patient location in [place], patient was at home.  She consented for this audiovisual visit.    Service Date:  4/24/24    Chief Complaint: Iron def anemia    Laverne Arcos is a 28 y.o. female here to follow-up on iron deficiency.  Doing well.  Had IV iron during her pregnancy but has not needed it since.  She does have some menorrhagia.    Review of Systems   Constitutional:  Positive for fatigue.   HENT: Negative.     Eyes: Negative.    Respiratory: Negative.     Cardiovascular: Negative.    Gastrointestinal: Negative.    Endocrine: Negative.    Genitourinary: Negative.    Musculoskeletal: Negative.    Integumentary:  Negative.   Neurological: Negative.    Hematological: Negative.    Psychiatric/Behavioral: Negative.          Current Outpatient Medications   Medication Instructions    fluticasone propionate (FLONASE) 100 mcg, Each Nostril, 2 times daily    levocetirizine (XYZAL) 5 mg, Oral, Nightly    NIFEdipine (PROCARDIA-XL) 30 mg, Oral, Daily    pantoprazole (PROTONIX) 40 mg, Oral, Daily    prenatal no122/iron/folic acid (PRENATAL MULTI ORAL) Oral        No past medical history on file.     No past surgical history on file.     Family History   Problem Relation Name Age of Onset    Hypertension Mother MLC     Arthritis Maternal Grandmother RRC     Diabetes Maternal Grandmother RRC     Heart disease Maternal Grandmother RRC     Stroke Maternal Grandmother RRC     Heart attack Father      Cancer Maternal Grandfather         Social History     Tobacco Use    Smoking status: Never    Smokeless tobacco: Never   Substance Use Topics    Alcohol use: No    Drug use: No         There were no vitals filed for this visit.     Physical Exam:  There were no vitals taken for this visit.    Physical Exam  Constitutional:       Appearance: Normal appearance.   HENT:      Head: Normocephalic and atraumatic.      Nose: Nose normal.      Mouth/Throat:      Mouth: Mucous membranes are  moist.      Pharynx: Oropharynx is clear.   Eyes:      Conjunctiva/sclera: Conjunctivae normal.   Cardiovascular:      Rate and Rhythm: Normal rate and regular rhythm.      Heart sounds: Normal heart sounds.   Pulmonary:      Effort: Pulmonary effort is normal.      Breath sounds: Normal breath sounds.   Abdominal:      General: Abdomen is flat. Bowel sounds are normal.      Palpations: Abdomen is soft.   Musculoskeletal:         General: Normal range of motion.      Cervical back: Normal range of motion and neck supple.   Skin:     General: Skin is warm and dry.   Neurological:      General: No focal deficit present.      Mental Status: She is alert and oriented to person, place, and time. Mental status is at baseline.   Psychiatric:         Mood and Affect: Mood normal.          Labs:  Lab Results   Component Value Date    WBC 4.94 04/12/2024    RBC 4.36 04/12/2024    HGB 13.4 04/12/2024    HCT 42.0 04/12/2024    MCV 96 04/12/2024    MCH 30.7 04/12/2024    MCHC 31.9 (L) 04/12/2024    RDW 12.4 04/12/2024     04/12/2024    MPV 12.0 04/12/2024    GRAN 1.6 (L) 04/12/2024    GRAN 32.2 (L) 04/12/2024    LYMPH 2.8 04/12/2024    LYMPH 57.5 (H) 04/12/2024    MONO 0.4 04/12/2024    MONO 8.7 04/12/2024    EOS 0.1 04/12/2024    BASO 0.02 04/12/2024    EOSINOPHIL 1.2 04/12/2024    BASOPHIL 0.4 04/12/2024     Sodium   Date Value Ref Range Status   07/22/2022 138 136 - 145 mmol/L Final     Potassium   Date Value Ref Range Status   07/22/2022 3.9 3.5 - 5.1 mmol/L Final     Chloride   Date Value Ref Range Status   07/22/2022 106 95 - 110 mmol/L Final     CO2   Date Value Ref Range Status   07/22/2022 22 (L) 23 - 29 mmol/L Final     Glucose   Date Value Ref Range Status   07/22/2022 89 70 - 110 mg/dL Final     BUN   Date Value Ref Range Status   07/22/2022 12 6 - 20 mg/dL Final     Creatinine   Date Value Ref Range Status   07/22/2022 0.8 0.5 - 1.4 mg/dL Final     Calcium   Date Value Ref Range Status   07/22/2022 9.7 8.7 -  10.5 mg/dL Final     Total Protein   Date Value Ref Range Status   07/22/2022 7.8 6.0 - 8.4 g/dL Final     Albumin   Date Value Ref Range Status   07/22/2022 3.9 3.5 - 5.2 g/dL Final     Total Bilirubin   Date Value Ref Range Status   07/22/2022 0.3 0.1 - 1.0 mg/dL Final     Comment:     For infants and newborns, interpretation of results should be based  on gestational age, weight and in agreement with clinical  observations.    Premature Infant recommended reference ranges:  Up to 24 hours.............<8.0 mg/dL  Up to 48 hours............<12.0 mg/dL  3-5 days..................<15.0 mg/dL  6-29 days.................<15.0 mg/dL       Alkaline Phosphatase   Date Value Ref Range Status   07/22/2022 97 55 - 135 U/L Final     AST   Date Value Ref Range Status   07/22/2022 21 10 - 40 U/L Final     ALT   Date Value Ref Range Status   07/22/2022 11 10 - 44 U/L Final     Anion Gap   Date Value Ref Range Status   07/22/2022 10 8 - 16 mmol/L Final     eGFR if    Date Value Ref Range Status   07/22/2022 >60.0 >60 mL/min/1.73 m^2 Final     eGFR if non    Date Value Ref Range Status   07/22/2022 >60.0 >60 mL/min/1.73 m^2 Final     Comment:     Calculation used to obtain the estimated glomerular filtration  rate (eGFR) is the CKD-EPI equation.          A/P:    Iron deficiency anemia  -initially due to recent pregnancy, but now menorrhagia  -iron infusions have been stable since delivering her baby.  She no longer needs IV iron with me.  She can return to clinic as needed if there was a drop in her iron for reason.  -she will follow up with her PCP      Aurash Khoobehi, MD  Hematology and Oncology
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
## 2024-10-21 DIAGNOSIS — Z00.00 ROUTINE ADULT HEALTH MAINTENANCE: Primary | ICD-10-CM

## 2024-10-21 DIAGNOSIS — Z76.89 ENCOUNTER TO ESTABLISH CARE: ICD-10-CM

## 2024-11-05 ENCOUNTER — HOSPITAL ENCOUNTER (OUTPATIENT)
Dept: CARDIOLOGY | Facility: HOSPITAL | Age: 29
Discharge: HOME | End: 2024-11-05
Attending: INTERNAL MEDICINE
Payer: COMMERCIAL

## 2024-11-05 ENCOUNTER — OFFICE VISIT (OUTPATIENT)
Dept: CARDIOLOGY | Facility: CLINIC | Age: 29
End: 2024-11-05
Payer: COMMERCIAL

## 2024-11-05 VITALS
BODY MASS INDEX: 24.33 KG/M2 | SYSTOLIC BLOOD PRESSURE: 126 MMHG | OXYGEN SATURATION: 97 % | WEIGHT: 137.38 LBS | DIASTOLIC BLOOD PRESSURE: 82 MMHG | HEART RATE: 75 BPM

## 2024-11-05 DIAGNOSIS — Z76.89 ENCOUNTER TO ESTABLISH CARE: ICD-10-CM

## 2024-11-05 DIAGNOSIS — D50.9 IRON DEFICIENCY ANEMIA, UNSPECIFIED IRON DEFICIENCY ANEMIA TYPE: ICD-10-CM

## 2024-11-05 DIAGNOSIS — Z00.00 ROUTINE ADULT HEALTH MAINTENANCE: ICD-10-CM

## 2024-11-05 DIAGNOSIS — I10 HYPERTENSION, UNSPECIFIED TYPE: Primary | ICD-10-CM

## 2024-11-05 DIAGNOSIS — E55.9 VITAMIN D DEFICIENCY: ICD-10-CM

## 2024-11-05 DIAGNOSIS — Z82.49 FAMILY HISTORY OF CARDIAC DISORDER: ICD-10-CM

## 2024-11-05 DIAGNOSIS — K21.9 GASTROESOPHAGEAL REFLUX DISEASE, UNSPECIFIED WHETHER ESOPHAGITIS PRESENT: ICD-10-CM

## 2024-11-05 PROCEDURE — 93005 ELECTROCARDIOGRAM TRACING: CPT

## 2024-11-05 PROCEDURE — 93010 ELECTROCARDIOGRAM REPORT: CPT | Mod: ,,, | Performed by: INTERNAL MEDICINE

## 2024-11-05 PROCEDURE — 3074F SYST BP LT 130 MM HG: CPT | Mod: CPTII,S$GLB,, | Performed by: INTERNAL MEDICINE

## 2024-11-05 PROCEDURE — 1159F MED LIST DOCD IN RCRD: CPT | Mod: CPTII,S$GLB,, | Performed by: INTERNAL MEDICINE

## 2024-11-05 PROCEDURE — 99214 OFFICE O/P EST MOD 30 MIN: CPT | Mod: S$GLB,,, | Performed by: INTERNAL MEDICINE

## 2024-11-05 PROCEDURE — 3079F DIAST BP 80-89 MM HG: CPT | Mod: CPTII,S$GLB,, | Performed by: INTERNAL MEDICINE

## 2024-11-05 PROCEDURE — 1160F RVW MEDS BY RX/DR IN RCRD: CPT | Mod: CPTII,S$GLB,, | Performed by: INTERNAL MEDICINE

## 2024-11-05 PROCEDURE — 99999 PR PBB SHADOW E&M-EST. PATIENT-LVL III: CPT | Mod: PBBFAC,,, | Performed by: INTERNAL MEDICINE

## 2024-11-05 PROCEDURE — 3008F BODY MASS INDEX DOCD: CPT | Mod: CPTII,S$GLB,, | Performed by: INTERNAL MEDICINE

## 2024-11-05 RX ORDER — ERGOCALCIFEROL 1.25 MG/1
50000 CAPSULE ORAL
Qty: 4 CAPSULE | Refills: 3 | Status: SHIPPED | OUTPATIENT
Start: 2024-11-05

## 2024-11-05 NOTE — PROGRESS NOTES
Subjective:   Patient ID:  Laverne Arcos is a 29 y.o. female who presents for cardiac consult of No chief complaint on file.      Referring Physician: Walker Bird MD   [id number] [place] [id number]    Reason for consult: CVD risk eval      HPI  The patient came in today for cardiac consult of No chief complaint on file.        Laverne Arcos is a 29 y.o. female pt with HTN, FH CVD, GERD, Fe def anemia, presents for follow up CV eval.     7/15/22  BP today is elevated initially 130/100 --> 130/80s. , BMI 28 - 163 lbs. She has been having CP/reflux sx - started PPI, improved a week ago.     22  Pt started on Nifedipine for BP. Bp has been well controlled now. She has dec salt intake. Iron level was 5, had IV iron.   ECG - sinus tach, V rate 105, nonspec changes     24  Follow up with me since 2022.   ECHO 2022 with normal bi V function, mild MR, mild TR, PASP 26 mmHg.     BP and HR stable today. Weight - 137 lbs   She went [facility] ER for meds as she almost ran out.   Overall normal BP. She had Flu last .   She had iron infusions  Her kids are 5 and 2  - boys    ECG - NSR       FH - father -  of MI 55; mother - HTN, grandmother -  after stroke, had MI; grandfather - HTN          Summary 2022  Show Result ComparisonThe left ventricle is normal in size with normal systolic function.  Normal left ventricular diastolic function.  The estimated PA systolic pressure is 26 mmHg.  Normal right ventricular size with normal right ventricular systolic function.  Normal central venous pressure (3 mmHg).  Mild mitral regurgitation.  Mild tricuspid regurgitation.  The estimated ejection fraction is 60%.       History reviewed. No pertinent past medical history.    History reviewed. No pertinent surgical history.    Social History     Tobacco Use    Smoking status: Never    Smokeless tobacco: Never   Substance Use Topics    Alcohol use: No    Drug use: No       Family  History   Problem Relation Name Age of Onset    Hypertension Mother MLC     Arthritis Maternal Grandmother RRC     Diabetes Maternal Grandmother RRC     Heart disease Maternal Grandmother RRC     Stroke Maternal Grandmother RRC     Heart attack Father      Cancer Maternal Grandfather         Patient's Medications   New Prescriptions    ERGOCALCIFEROL (VITAMIN D2) 50,000 UNIT CAP    Take 1 capsule (50,000 Units total) by mouth every 7 days.   Previous Medications    FLUTICASONE PROPIONATE (FLONASE) 50 MCG/ACTUATION NASAL SPRAY    2 sprays (100 mcg total) by Each Nostril route 2 (two) times daily.    LEVOCETIRIZINE (XYZAL) 5 MG TABLET    Take 1 tablet (5 mg total) by mouth every evening.    NIFEDIPINE (PROCARDIA-XL) 30 MG (OSM) 24 HR TABLET    Take 1 tablet (30 mg total) by mouth once daily.    PANTOPRAZOLE (PROTONIX) 40 MG TABLET    Take 1 tablet (40 mg total) by mouth once daily.    PRENATAL /IRON/FOLIC ACID (PRENATAL MULTI ORAL)    Take by mouth.   Modified Medications    No medications on file   Discontinued Medications    No medications on file       Review of Systems   Constitutional: Negative.    HENT: Negative.     Eyes: Negative.    Respiratory: Negative.     Cardiovascular:  Positive for chest pain.   Gastrointestinal: Negative.    Genitourinary: Negative.    Musculoskeletal: Negative.    Skin: Negative.    Neurological: Negative.    Endo/Heme/Allergies: Negative.    Psychiatric/Behavioral: Negative.     All 12 systems otherwise negative.      Wt Readings from Last 3 Encounters:   11/05/24 62.3 kg (137 lb 5.6 oz)   09/23/22 72 kg (158 lb 11.7 oz)   08/05/22 74.4 kg (164 lb)     Temp Readings from Last 3 Encounters:   05/12/23 97.3 °F (36.3 °C)   05/05/23 98.2 °F (36.8 °C)   09/16/22 97.5 °F (36.4 °C)     BP Readings from Last 3 Encounters:   11/05/24 126/82   05/12/23 124/84   05/05/23 124/79     Pulse Readings from Last 3 Encounters:   11/05/24 75   05/12/23 81   05/05/23 78       /82 (Patient  Position: Sitting)   Pulse 75   Wt 62.3 kg (137 lb 5.6 oz)   SpO2 97%   BMI 24.33 kg/m²     Objective:   Physical Exam  Vitals and nursing note reviewed.   Constitutional:       General: She is not in acute distress.     Appearance: She is well-developed. She is not diaphoretic.   HENT:      Head: Normocephalic and atraumatic.      Nose: Nose normal.   Eyes:      General: No scleral icterus.     Conjunctiva/sclera: Conjunctivae normal.   Neck:      Thyroid: No thyromegaly.      Vascular: No JVD.   Cardiovascular:      Rate and Rhythm: Normal rate and regular rhythm.      Heart sounds: S1 normal and S2 normal. No murmur heard.     No friction rub. No gallop. No S3 or S4 sounds.   Pulmonary:      Effort: Pulmonary effort is normal. No respiratory distress.      Breath sounds: Normal breath sounds. No stridor. No wheezing or rales.   Chest:      Chest wall: No tenderness.   Abdominal:      General: Bowel sounds are normal. There is no distension.      Palpations: Abdomen is soft. There is no mass.      Tenderness: There is no abdominal tenderness. There is no rebound.   Genitourinary:     Comments: Deferred  Musculoskeletal:         General: No tenderness or deformity. Normal range of motion.      Cervical back: Normal range of motion and neck supple.   Lymphadenopathy:      Cervical: No cervical adenopathy.   Skin:     General: Skin is warm and dry.      Coloration: Skin is not pale.      Findings: No erythema or rash.   Neurological:      Mental Status: She is alert and oriented to person, place, and time.      Motor: No abnormal muscle tone.      Coordination: Coordination normal.   Psychiatric:         Behavior: Behavior normal.         Thought Content: Thought content normal.         Judgment: Judgment normal.         Lab Results   Component Value Date     07/22/2022    K 3.9 07/22/2022     07/22/2022    CO2 22 (L) 07/22/2022    BUN 12 07/22/2022    CREATININE 0.8 07/22/2022    GLU 89 07/22/2022     HGBA1C 5.2 07/22/2022    AST 21 07/22/2022    ALT 11 07/22/2022    ALBUMIN 3.9 07/22/2022    PROT 7.8 07/22/2022    BILITOT 0.3 07/22/2022    WBC 4.94 04/12/2024    HGB 13.4 04/12/2024    HCT 42.0 04/12/2024    MCV 96 04/12/2024     04/12/2024    TSH 1.104 07/22/2022    CHOL 160 07/22/2022    HDL 52 07/22/2022    LDLCALC 101.0 07/22/2022    TRIG 35 07/22/2022     Assessment:      1. Hypertension, unspecified type    2. Iron deficiency anemia, unspecified iron deficiency anemia type    3. Gastroesophageal reflux disease, unspecified whether esophagitis present    4. Family history of cardiac disorder    5. Vitamin D deficiency            Plan:     1. FH CAD with CP  - ECHO 7/2022 with normal bi V function, mild MR, mild TR, PASP 26 mmHg.   - discussed non cardiac etiologies as well    2. GERD  - cont PPI  - f/u GI    3. HTN  - titrate meds - taking nifedipine 20mg BID    4. Fe def anemia  - cont iron infusions, f/u hemeonc    5. Low Vit D  - start high dose Vit D     Thank you for allowing me to participate in this patient's care. Please do not hesitate to contact me with any questions or concerns. Consult note has been forwarded to the referral physician.

## 2024-11-07 LAB
OHS QRS DURATION: 72 MS
OHS QTC CALCULATION: 418 MS